# Patient Record
Sex: FEMALE | Race: WHITE | NOT HISPANIC OR LATINO | Employment: OTHER | URBAN - METROPOLITAN AREA
[De-identification: names, ages, dates, MRNs, and addresses within clinical notes are randomized per-mention and may not be internally consistent; named-entity substitution may affect disease eponyms.]

---

## 2018-01-15 ENCOUNTER — HISTORICAL (OUTPATIENT)
Dept: ADMINISTRATIVE | Facility: HOSPITAL | Age: 68
End: 2018-01-15

## 2018-01-15 LAB
ALBUMIN SERPL-MCNC: 3.4 GM/DL (ref 3.4–5)
ALBUMIN/GLOB SERPL: 1.2 RATIO (ref 1.1–2)
ALP SERPL-CCNC: 74 UNIT/L (ref 38–126)
ALT SERPL-CCNC: 16 UNIT/L (ref 12–78)
AST SERPL-CCNC: 10 UNIT/L (ref 15–37)
BILIRUB SERPL-MCNC: 0.4 MG/DL (ref 0.2–1)
BILIRUBIN DIRECT+TOT PNL SERPL-MCNC: 0.1 MG/DL (ref 0–0.5)
BILIRUBIN DIRECT+TOT PNL SERPL-MCNC: 0.3 MG/DL (ref 0–0.8)
BNP BLD-MCNC: 114 PG/ML (ref 0–125)
BUN SERPL-MCNC: 19 MG/DL (ref 7–18)
CALCIUM SERPL-MCNC: 9 MG/DL (ref 8.5–10.1)
CHLORIDE SERPL-SCNC: 103 MMOL/L (ref 98–107)
CO2 SERPL-SCNC: 26 MMOL/L (ref 21–32)
CREAT SERPL-MCNC: 0.65 MG/DL (ref 0.55–1.02)
ERYTHROCYTE [DISTWIDTH] IN BLOOD BY AUTOMATED COUNT: 13.9 % (ref 11.5–17)
GLOBULIN SER-MCNC: 2.8 GM/DL (ref 2.4–3.5)
GLUCOSE SERPL-MCNC: 156 MG/DL (ref 74–106)
HCT VFR BLD AUTO: 37.9 % (ref 37–47)
HGB BLD-MCNC: 12.1 GM/DL (ref 12–16)
MAGNESIUM SERPL-MCNC: 1.8 MG/DL (ref 1.8–2.4)
MCH RBC QN AUTO: 28.5 PG (ref 27–31)
MCHC RBC AUTO-ENTMCNC: 31.9 GM/DL (ref 33–36)
MCV RBC AUTO: 89.2 FL (ref 80–94)
PLATELET # BLD AUTO: 270 X10(3)/MCL (ref 130–400)
PMV BLD AUTO: 10.2 FL (ref 9.4–12.4)
POTASSIUM SERPL-SCNC: 3.5 MMOL/L (ref 3.5–5.1)
PROT SERPL-MCNC: 6.2 GM/DL (ref 6.4–8.2)
RBC # BLD AUTO: 4.25 X10(6)/MCL (ref 4.2–5.4)
SODIUM SERPL-SCNC: 140 MMOL/L (ref 136–145)
WBC # SPEC AUTO: 8.5 X10(3)/MCL (ref 4.5–11.5)

## 2020-09-29 ENCOUNTER — HOSPITAL ENCOUNTER (INPATIENT)
Facility: HOSPITAL | Age: 70
LOS: 1 days | Discharge: HOME | DRG: 085 | End: 2020-10-01
Attending: FAMILY MEDICINE | Admitting: EMERGENCY MEDICINE
Payer: MEDICARE

## 2020-09-29 DIAGNOSIS — I48.91 A-FIB: ICD-10-CM

## 2020-09-29 DIAGNOSIS — I60.9 SUBARACHNOID HEMORRHAGE: Primary | ICD-10-CM

## 2020-09-29 DIAGNOSIS — I63.311 STROKE DUE TO THROMBOSIS OF RIGHT MIDDLE CEREBRAL ARTERY: ICD-10-CM

## 2020-09-29 DIAGNOSIS — I48.0 PAROXYSMAL ATRIAL FIBRILLATION: ICD-10-CM

## 2020-09-29 PROCEDURE — 99285 EMERGENCY DEPT VISIT HI MDM: CPT | Mod: 25

## 2020-09-29 RX ORDER — ASPIRIN 81 MG/1
81 TABLET ORAL DAILY
COMMUNITY

## 2020-09-29 RX ORDER — METFORMIN HYDROCHLORIDE 1000 MG/1
1000 TABLET ORAL 2 TIMES DAILY WITH MEALS
COMMUNITY

## 2020-09-29 RX ORDER — SOTALOL HYDROCHLORIDE 120 MG/1
80 TABLET ORAL EVERY 12 HOURS
COMMUNITY

## 2020-09-29 RX ORDER — SIMVASTATIN 40 MG/1
40 TABLET, FILM COATED ORAL NIGHTLY
COMMUNITY

## 2020-09-29 RX ORDER — CITALOPRAM 40 MG/1
40 TABLET, FILM COATED ORAL DAILY
COMMUNITY

## 2020-09-29 RX ORDER — LANOLIN ALCOHOL/MO/W.PET/CERES
400 CREAM (GRAM) TOPICAL DAILY
COMMUNITY

## 2020-09-29 RX ORDER — LEVOTHYROXINE SODIUM 13 UG/1
CAPSULE ORAL
COMMUNITY

## 2020-09-29 RX ORDER — POTASSIUM CHLORIDE 600 MG/1
8 TABLET, FILM COATED, EXTENDED RELEASE ORAL ONCE
COMMUNITY

## 2020-09-29 RX ORDER — MIDODRINE HYDROCHLORIDE 5 MG/1
2.5 TABLET ORAL 2 TIMES DAILY WITH MEALS
COMMUNITY

## 2020-09-29 RX ORDER — OXYBUTYNIN CHLORIDE 5 MG/1
5 TABLET, EXTENDED RELEASE ORAL DAILY
COMMUNITY

## 2020-09-29 RX ORDER — ISOSORBIDE MONONITRATE 10 MG/1
10 TABLET ORAL 2 TIMES DAILY
COMMUNITY

## 2020-09-29 RX ORDER — CLOPIDOGREL BISULFATE 75 MG/1
75 TABLET ORAL DAILY
COMMUNITY

## 2020-09-29 RX ORDER — GABAPENTIN 300 MG/1
300 CAPSULE ORAL 3 TIMES DAILY
COMMUNITY

## 2020-09-29 RX ORDER — CELECOXIB 200 MG/1
200 CAPSULE ORAL
COMMUNITY

## 2020-09-30 PROBLEM — J44.9 CHRONIC OBSTRUCTIVE PULMONARY DISEASE: Status: ACTIVE | Noted: 2020-09-30

## 2020-09-30 PROBLEM — I60.9 SUBARACHNOID HEMORRHAGE: Status: ACTIVE | Noted: 2020-09-30

## 2020-09-30 PROBLEM — I49.5 TACHYCARDIA-BRADYCARDIA: Status: ACTIVE | Noted: 2020-09-30

## 2020-09-30 PROBLEM — E03.9 HYPOTHYROIDISM: Status: ACTIVE | Noted: 2020-09-30

## 2020-09-30 PROBLEM — E83.42 HYPOMAGNESEMIA: Status: ACTIVE | Noted: 2020-09-30

## 2020-09-30 PROBLEM — I25.10 ATHEROSCLEROSIS OF CORONARY ARTERY: Status: ACTIVE | Noted: 2020-09-30

## 2020-09-30 PROBLEM — I48.91 ATRIAL FIBRILLATION: Status: ACTIVE | Noted: 2020-09-30

## 2020-09-30 PROBLEM — E78.5 HYPERLIPIDEMIA: Status: ACTIVE | Noted: 2020-09-30

## 2020-09-30 PROBLEM — I10 HYPERTENSION: Status: ACTIVE | Noted: 2020-09-30

## 2020-09-30 PROBLEM — I77.9 PERIPHERAL ARTERIAL OCCLUSIVE DISEASE: Status: ACTIVE | Noted: 2020-09-30

## 2020-09-30 PROBLEM — E11.9 DIABETES MELLITUS: Status: ACTIVE | Noted: 2020-09-30

## 2020-09-30 PROBLEM — I95.1 ORTHOSTATIC HYPOTENSION: Status: ACTIVE | Noted: 2020-09-30

## 2020-09-30 LAB
ALBUMIN SERPL BCP-MCNC: 3.8 G/DL (ref 3.5–5.2)
ALP SERPL-CCNC: 69 U/L (ref 55–135)
ALT SERPL W/O P-5'-P-CCNC: 20 U/L (ref 10–44)
ANION GAP SERPL CALC-SCNC: 16 MMOL/L (ref 8–16)
AORTIC ROOT ANNULUS: 3.31 CM
ASCENDING AORTA: 3.2 CM
AST SERPL-CCNC: 22 U/L (ref 10–40)
AV INDEX (PROSTH): 0.92
AV MEAN GRADIENT: 2 MMHG
AV PEAK GRADIENT: 3 MMHG
AV VALVE AREA: 2.72 CM2
AV VELOCITY RATIO: 0.82
BASOPHILS # BLD AUTO: 0.02 K/UL (ref 0–0.2)
BASOPHILS NFR BLD: 0.2 % (ref 0–1.9)
BILIRUB SERPL-MCNC: 0.6 MG/DL (ref 0.1–1)
BSA FOR ECHO PROCEDURE: 2.45 M2
BUN SERPL-MCNC: 15 MG/DL (ref 8–23)
CALCIUM SERPL-MCNC: 9.1 MG/DL (ref 8.7–10.5)
CHLORIDE SERPL-SCNC: 98 MMOL/L (ref 95–110)
CHOLEST SERPL-MCNC: 224 MG/DL (ref 120–199)
CHOLEST/HDLC SERPL: 4.7 {RATIO} (ref 2–5)
CO2 SERPL-SCNC: 28 MMOL/L (ref 23–29)
CREAT SERPL-MCNC: 0.8 MG/DL (ref 0.5–1.4)
CV ECHO LV RWT: 0.7 CM
DIFFERENTIAL METHOD: ABNORMAL
DOP CALC AO PEAK VEL: 0.92 M/S
DOP CALC AO VTI: 16.94 CM
DOP CALC LVOT AREA: 3 CM2
DOP CALC LVOT DIAMETER: 1.94 CM
DOP CALC LVOT PEAK VEL: 0.75 M/S
DOP CALC LVOT STROKE VOLUME: 46.12 CM3
DOP CALC RVOT PEAK VEL: 0.69 M/S
DOP CALC RVOT VTI: 14.32 CM
DOP CALCLVOT PEAK VEL VTI: 15.61 CM
E WAVE DECELERATION TIME: 255.65 MSEC
E/A RATIO: 0.83
ECHO LV POSTERIOR WALL: 1.7 CM (ref 0.6–1.1)
EOSINOPHIL # BLD AUTO: 0.1 K/UL (ref 0–0.5)
EOSINOPHIL NFR BLD: 1.4 % (ref 0–8)
ERYTHROCYTE [DISTWIDTH] IN BLOOD BY AUTOMATED COUNT: 14.2 % (ref 11.5–14.5)
EST. GFR  (AFRICAN AMERICAN): >60 ML/MIN/1.73 M^2
EST. GFR  (NON AFRICAN AMERICAN): >60 ML/MIN/1.73 M^2
FRACTIONAL SHORTENING: 19 % (ref 28–44)
GLUCOSE SERPL-MCNC: 124 MG/DL (ref 70–110)
HCT VFR BLD AUTO: 42.3 % (ref 37–48.5)
HDLC SERPL-MCNC: 48 MG/DL (ref 40–75)
HDLC SERPL: 21.4 % (ref 20–50)
HGB BLD-MCNC: 14.1 G/DL (ref 12–16)
IMM GRANULOCYTES # BLD AUTO: 0.06 K/UL (ref 0–0.04)
IMM GRANULOCYTES NFR BLD AUTO: 0.7 % (ref 0–0.5)
INTERVENTRICULAR SEPTUM: 1.68 CM (ref 0.6–1.1)
IVRT: 96.89 MSEC
LA MAJOR: 4.65 CM
LA MINOR: 4.88 CM
LA WIDTH: 3.67 CM
LDLC SERPL CALC-MCNC: 135.2 MG/DL (ref 63–159)
LEFT ATRIUM SIZE: 3.86 CM
LEFT ATRIUM VOLUME INDEX: 24.6 ML/M2
LEFT ATRIUM VOLUME: 57.34 CM3
LEFT INTERNAL DIMENSION IN SYSTOLE: 3.93 CM (ref 2.1–4)
LEFT VENTRICLE DIASTOLIC VOLUME INDEX: 48.06 ML/M2
LEFT VENTRICLE DIASTOLIC VOLUME: 111.94 ML
LEFT VENTRICLE MASS INDEX: 160 G/M2
LEFT VENTRICLE SYSTOLIC VOLUME INDEX: 28.8 ML/M2
LEFT VENTRICLE SYSTOLIC VOLUME: 66.97 ML
LEFT VENTRICULAR INTERNAL DIMENSION IN DIASTOLE: 4.88 CM (ref 3.5–6)
LEFT VENTRICULAR MASS: 372.79 G
LYMPHOCYTES # BLD AUTO: 1.7 K/UL (ref 1–4.8)
LYMPHOCYTES NFR BLD: 19.7 % (ref 18–48)
MAGNESIUM SERPL-MCNC: 1.9 MG/DL (ref 1.6–2.6)
MCH RBC QN AUTO: 30.8 PG (ref 27–31)
MCHC RBC AUTO-ENTMCNC: 33.3 G/DL (ref 32–36)
MCV RBC AUTO: 92 FL (ref 82–98)
MONOCYTES # BLD AUTO: 0.5 K/UL (ref 0.3–1)
MONOCYTES NFR BLD: 5.5 % (ref 4–15)
MV PEAK A VEL: 0.58 M/S
MV PEAK E VEL: 0.48 M/S
MV STENOSIS PRESSURE HALF TIME: 74.14 MS
MV VALVE AREA P 1/2 METHOD: 2.97 CM2
NEUTROPHILS # BLD AUTO: 6.3 K/UL (ref 1.8–7.7)
NEUTROPHILS NFR BLD: 72.5 % (ref 38–73)
NONHDLC SERPL-MCNC: 176 MG/DL
NRBC BLD-RTO: 0 /100 WBC
PISA MRMAX VEL: 0.04 M/S
PLATELET # BLD AUTO: 245 K/UL (ref 150–350)
PMV BLD AUTO: 11 FL (ref 9.2–12.9)
POTASSIUM SERPL-SCNC: 2.9 MMOL/L (ref 3.5–5.1)
PROT SERPL-MCNC: 6.5 G/DL (ref 6–8.4)
PV MEAN GRADIENT: 1.26 MMHG
RA MAJOR: 3.65 CM
RA PRESSURE: 3 MMHG
RA WIDTH: 3.48 CM
RBC # BLD AUTO: 4.58 M/UL (ref 4–5.4)
SARS-COV-2 RDRP RESP QL NAA+PROBE: NEGATIVE
SINUS: 3.54 CM
SODIUM SERPL-SCNC: 142 MMOL/L (ref 136–145)
STJ: 3.45 CM
T4 FREE SERPL-MCNC: <0.4 NG/DL (ref 0.71–1.51)
TRICUSPID ANNULAR PLANE SYSTOLIC EXCURSION: 1.53 CM
TRIGL SERPL-MCNC: 204 MG/DL (ref 30–150)
TSH SERPL DL<=0.005 MIU/L-ACNC: 35.85 UIU/ML (ref 0.4–4)
WBC # BLD AUTO: 8.71 K/UL (ref 3.9–12.7)

## 2020-09-30 PROCEDURE — U0002 COVID-19 LAB TEST NON-CDC: HCPCS

## 2020-09-30 PROCEDURE — 93005 ELECTROCARDIOGRAM TRACING: CPT

## 2020-09-30 PROCEDURE — 83735 ASSAY OF MAGNESIUM: CPT

## 2020-09-30 PROCEDURE — 97110 THERAPEUTIC EXERCISES: CPT

## 2020-09-30 PROCEDURE — 99223 PR INITIAL HOSPITAL CARE,LEVL III: ICD-10-PCS | Mod: 25,,, | Performed by: INTERNAL MEDICINE

## 2020-09-30 PROCEDURE — 63600175 PHARM REV CODE 636 W HCPCS: Performed by: EMERGENCY MEDICINE

## 2020-09-30 PROCEDURE — 84439 ASSAY OF FREE THYROXINE: CPT

## 2020-09-30 PROCEDURE — 97530 THERAPEUTIC ACTIVITIES: CPT

## 2020-09-30 PROCEDURE — 99223 1ST HOSP IP/OBS HIGH 75: CPT | Mod: 25,,, | Performed by: INTERNAL MEDICINE

## 2020-09-30 PROCEDURE — 80061 LIPID PANEL: CPT

## 2020-09-30 PROCEDURE — 93010 ELECTROCARDIOGRAM REPORT: CPT | Mod: ,,, | Performed by: INTERNAL MEDICINE

## 2020-09-30 PROCEDURE — 99223 PR INITIAL HOSPITAL CARE,LEVL III: ICD-10-PCS | Mod: ,,, | Performed by: PSYCHIATRY & NEUROLOGY

## 2020-09-30 PROCEDURE — 25000003 PHARM REV CODE 250: Performed by: INTERNAL MEDICINE

## 2020-09-30 PROCEDURE — 97165 OT EVAL LOW COMPLEX 30 MIN: CPT

## 2020-09-30 PROCEDURE — 84443 ASSAY THYROID STIM HORMONE: CPT

## 2020-09-30 PROCEDURE — 80053 COMPREHEN METABOLIC PANEL: CPT

## 2020-09-30 PROCEDURE — 25000003 PHARM REV CODE 250: Performed by: PSYCHIATRY & NEUROLOGY

## 2020-09-30 PROCEDURE — 97161 PT EVAL LOW COMPLEX 20 MIN: CPT

## 2020-09-30 PROCEDURE — 11000001 HC ACUTE MED/SURG PRIVATE ROOM

## 2020-09-30 PROCEDURE — 36415 COLL VENOUS BLD VENIPUNCTURE: CPT

## 2020-09-30 PROCEDURE — 93010 EKG 12-LEAD: ICD-10-PCS | Mod: ,,, | Performed by: INTERNAL MEDICINE

## 2020-09-30 PROCEDURE — 99223 1ST HOSP IP/OBS HIGH 75: CPT | Mod: ,,, | Performed by: PSYCHIATRY & NEUROLOGY

## 2020-09-30 PROCEDURE — 85025 COMPLETE CBC W/AUTO DIFF WBC: CPT

## 2020-09-30 RX ORDER — SODIUM CHLORIDE 0.9 % (FLUSH) 0.9 %
10 SYRINGE (ML) INJECTION
Status: DISCONTINUED | OUTPATIENT
Start: 2020-09-30 | End: 2020-10-01 | Stop reason: HOSPADM

## 2020-09-30 RX ORDER — GLUCAGON 1 MG
1 KIT INJECTION
Status: DISCONTINUED | OUTPATIENT
Start: 2020-09-30 | End: 2020-10-01 | Stop reason: HOSPADM

## 2020-09-30 RX ORDER — SOTALOL HYDROCHLORIDE 80 MG/1
80 TABLET ORAL EVERY 12 HOURS
Status: DISCONTINUED | OUTPATIENT
Start: 2020-09-30 | End: 2020-10-01 | Stop reason: HOSPADM

## 2020-09-30 RX ORDER — SIMVASTATIN 20 MG/1
60 TABLET, FILM COATED ORAL NIGHTLY
Status: DISCONTINUED | OUTPATIENT
Start: 2020-09-30 | End: 2020-10-01 | Stop reason: HOSPADM

## 2020-09-30 RX ORDER — LEVOTHYROXINE SODIUM 100 UG/1
100 TABLET ORAL
Status: DISCONTINUED | OUTPATIENT
Start: 2020-09-30 | End: 2020-10-01 | Stop reason: HOSPADM

## 2020-09-30 RX ORDER — IBUPROFEN 200 MG
16 TABLET ORAL
Status: DISCONTINUED | OUTPATIENT
Start: 2020-09-30 | End: 2020-10-01 | Stop reason: HOSPADM

## 2020-09-30 RX ORDER — LABETALOL HYDROCHLORIDE 5 MG/ML
10 INJECTION, SOLUTION INTRAVENOUS EVERY 4 HOURS PRN
Status: DISCONTINUED | OUTPATIENT
Start: 2020-09-30 | End: 2020-10-01 | Stop reason: HOSPADM

## 2020-09-30 RX ORDER — CLOPIDOGREL BISULFATE 75 MG/1
75 TABLET ORAL DAILY
Status: DISCONTINUED | OUTPATIENT
Start: 2020-10-01 | End: 2020-10-01 | Stop reason: HOSPADM

## 2020-09-30 RX ORDER — IBUPROFEN 200 MG
24 TABLET ORAL
Status: DISCONTINUED | OUTPATIENT
Start: 2020-09-30 | End: 2020-10-01 | Stop reason: HOSPADM

## 2020-09-30 RX ORDER — ASPIRIN 81 MG/1
81 TABLET ORAL DAILY
Status: DISCONTINUED | OUTPATIENT
Start: 2020-10-01 | End: 2020-10-01 | Stop reason: HOSPADM

## 2020-09-30 RX ORDER — SIMVASTATIN 20 MG/1
40 TABLET, FILM COATED ORAL NIGHTLY
Status: DISCONTINUED | OUTPATIENT
Start: 2020-09-30 | End: 2020-09-30

## 2020-09-30 RX ADMIN — LEVOTHYROXINE SODIUM 100 MCG: 0.1 TABLET ORAL at 05:09

## 2020-09-30 RX ADMIN — SOTALOL HYDROCHLORIDE 80 MG: 80 TABLET ORAL at 09:09

## 2020-09-30 RX ADMIN — HUMAN ALBUMIN MICROSPHERES AND PERFLUTREN 0.11 MG: 10; .22 INJECTION, SOLUTION INTRAVENOUS at 12:09

## 2020-09-30 RX ADMIN — SOTALOL HYDROCHLORIDE 80 MG: 80 TABLET ORAL at 10:09

## 2020-09-30 RX ADMIN — SIMVASTATIN 40 MG: 20 TABLET, FILM COATED ORAL at 01:09

## 2020-09-30 RX ADMIN — SIMVASTATIN 60 MG: 20 TABLET, FILM COATED ORAL at 09:09

## 2020-09-30 NOTE — PT/OT/SLP EVAL
Occupational Therapy   Evaluation and Discharge Note    Name: Monae Van  MRN: [id number]  Admitting Diagnosis:  Subarachnoid hemorrhage      Recommendations:     Discharge Recommendations: home  Discharge Equipment Recommendations:  none  Barriers to discharge:  None    Assessment:     Monae Van is a 70 y.o. female with a medical diagnosis of Subarachnoid hemorrhage. At this time, patient is functioning at their prior level of function and does not require further acute OT services.     Plan:     During this hospitalization, patient does not require further acute OT services.  Please re-consult if situation changes.    · Plan of Care Reviewed with: patient   · Pt placed on people 's program    Subjective     Chief Complaint:   Patient/Family Comments/goals:     Occupational Profile:  Living Environment: lives in 1 story house with 5 steps with 1 side rail  Previous level of function: (I) with adl's and functional mobility  Roles and Routines: occupational therapy  Equipment Used at home:  walker, rolling, cane, straight  Assistance upon Discharge:     Pain/Comfort:  · Pain Rating 1: 0/10    Patients cultural, spiritual, Christianity conflicts given the current situation:      Objective:     Communicated with: nurse and epic chart review prior to session.  Patient found HOB elevated with telemetry upon OT entry to room.    General Precautions: Standard,     Orthopedic Precautions:N/A   Braces: N/A     Occupational Performance:    Bed Mobility:    · Patient completed Rolling/Turning to Right with independence  · Patient completed Scooting/Bridging with independence  · Patient completed Supine to Sit with independence    Functional Mobility/Transfers:  · Patient completed Sit <> Stand Transfer with independence  with  no assistive device   · Patient completed Bed <> Chair Transfer using Step Transfer technique with independence with no assistive device  · Functional Mobility: (I) with ambulation x 20  feet    Activities of Daily Living:  · Upper Body Dressing: supervision .  · Lower Body Dressing: supervision .    Cognitive/Visual Perceptual:  Cognitive/Psychosocial Skills:     -       Oriented to: Person, Place, Time and Situation   -       Follows Commands/attention:Follows multistep  commands  -       Communication: clear/fluent  -       Memory: No Deficits noted  -       Safety awareness/insight to disability: intact     Physical Exam:  Upper Extremity Range of Motion:     -       Right Upper Extremity: WFL  -       Left Upper Extremity: WFL  Upper Extremity Strength:    -       Right Upper Extremity: mmt: 4/5 grossly  -       Left Upper Extremity: mmt: 4/5 grossly   Strength:    -       Right Upper Extremity: WFL  -       Left Upper Extremity: WFL       AMPAC 6 Click ADL:  AMPAC Total Score: 24    Treatment & Education:  Pt with min deficits with ue strength /endurance. See above eval for details. Pt discharged from skilled ot and placed on people 's program  Education:    Patient left up in chair with all lines intact and call button in reach eating dinner    GOALS:   Multidisciplinary Problems     Occupational Therapy Goals     Not on file                History:     Past Medical History:   Diagnosis Date    A-fib     CAD (coronary artery disease)     COPD (chronic obstructive pulmonary disease)     Diabetes     HTN (hypertension)     Pacemaker     [employer]: MRI Compatible    PAD (peripheral artery disease)     S/P CABG (coronary artery bypass graft) 2017    S/P carotid endarterectomy 2015    left       Past Surgical History:   Procedure Laterality Date    APPENDECTOMY      cardiac stents      CHOLECYSTECTOMY      CORONARY ARTERY BYPASS GRAFT      HYSTERECTOMY      THYROID SURGERY         Time Tracking:     OT Date of Treatment: 09/30/20  OT Start Time: 1640  OT Stop Time: 1710  OT Total Time (min): 30 min    Billable Minutes:Evaluation 15 minutes  Therapeutic Activity 15  minutes    Brenda Fontaine, OT  9/30/2020

## 2020-09-30 NOTE — H&P
[facility] Hospital Medicine  History & Physical    Patient Name: Monae Van  MRN: [id number]  Admission Date: 9/29/2020  Attending Physician: Mckenzie Elise MD   Primary Care Provider: Yoshi Cordero MD         Patient information was obtained from patient, past medical records and ER records.     Subjective:     Principal Problem:Subarachnoid hemorrhage    Chief Complaint:   Chief Complaint   Patient presents with    Extremity Weakness     Transfer , Subarachnoid bleed, s/p fall 3 days        HPI:  History was taken from patient and from review of PMH from [facility]. She says she cannot remember exact dates.  70 y.o. female patient with a PMHx of A-fib, CAD, diabetes, and HTN ,low back pain,pacemaker in situ  who presents to the Emergency Department for evaluation of left-sided extremity weakness which onset gradually 4 days ago. She was seen at [facility] on 09.29 with chief complaint of left sided weakness  For 3 days She went to visit her sister 4 days ago and she feel and hit and her head and woke up with severe headache.CT head showed a small subarachnoid hemorrhage without falx effect .  She was transferred here for neurosurgery evaluation.  There was associated history of slurred speech and left sided weakness that was noticed by the  granddaughter too.Patient denies any n/v, visual disturbance, LOC, dizziness, back pain, neck pain, knee pain, hip pain, abdominal pain. She is on Plavix and Asprin at home  .    Past Medical History:   Diagnosis Date    A-fib     CAD (coronary artery disease)     COPD (chronic obstructive pulmonary disease)     Diabetes     HTN (hypertension)     Pacemaker     PAD (peripheral artery disease)        Past Surgical History:   Procedure Laterality Date    APPENDECTOMY      cardiac stents      CHOLECYSTECTOMY      CORONARY ARTERY BYPASS GRAFT      HYSTERECTOMY      THYROID SURGERY         Review of patient's allergies indicates:    Allergen Reactions    Avelox [moxifloxacin]     Hydrocodone     Morpholine analogues     Panlor (hydrocodone-acetamin)        No current facility-administered medications on file prior to encounter.      Current Outpatient Medications on File Prior to Encounter   Medication Sig    aspirin (ECOTRIN) 81 MG EC tablet Take 81 mg by mouth once daily.    celecoxib (CELEBREX) 200 MG capsule Take 200 mg by mouth.    citalopram (CELEXA) 40 MG tablet Take 40 mg by mouth once daily.    clopidogreL (PLAVIX) 75 mg tablet Take 75 mg by mouth once daily.    gabapentin (NEURONTIN) 300 MG capsule Take 300 mg by mouth 3 (three) times daily.    levothyroxine 150 mcg Cap Take by mouth.    metFORMIN (GLUCOPHAGE) 1000 MG tablet Take 1,000 mg by mouth 2 (two) times daily with meals.    simvastatin (ZOCOR) 40 MG tablet Take 40 mg by mouth every evening.    isosorbide mononitrate (ISMO,MONOKET) 10 mg tablet Take 10 mg by mouth 2 (two) times daily.    magnesium oxide (MAG-OX) 400 mg (241.3 mg magnesium) tablet Take 400 mg by mouth once daily.    midodrine (PROAMATINE) 5 MG Tab Take 2.5 mg by mouth 2 (two) times daily with meals.    oxybutynin (DITROPAN-XL) 5 MG TR24 Take 5 mg by mouth once daily.    potassium chloride (KLOR-CON) 8 MEQ TbSR Take 8 mEq by mouth once.    sotaloL (BETAPACE) 120 MG Tab Take 80 mg by mouth every 12 (twelve) hours.     Family History     None        Tobacco Use    Smoking status: Former Smoker    Smokeless tobacco: Never Used   Substance and Sexual Activity    Alcohol use: Never     Frequency: Never    Drug use: Never    Sexual activity: Not on file     Review of Systems   Constitutional: Positive for activity change. Negative for chills and fatigue.   HENT: Negative for congestion, ear pain, facial swelling, sinus pressure and sore throat.    Eyes: Negative for pain.   Respiratory: Negative for apnea, chest tightness, shortness of breath and stridor.    Cardiovascular: Negative for chest  pain, palpitations and leg swelling.   Gastrointestinal: Negative for abdominal distention, abdominal pain, diarrhea and nausea.   Endocrine: Negative for polydipsia and polyphagia.   Genitourinary: Negative for decreased urine volume, difficulty urinating, frequency and genital sores.   Musculoskeletal: Negative for arthralgias and gait problem.   Neurological: Negative for light-headedness and headaches.   Hematological: Negative for adenopathy.   Psychiatric/Behavioral: Negative for agitation, confusion and decreased concentration.     Objective:     Vital Signs (Most Recent):  Temp: 97.5 °F (36.4 °C) (09/30/20 0718)  Pulse: 71 (09/30/20 0718)  Resp: 18 (09/30/20 0718)  BP: 120/69 (09/30/20 0718)  SpO2: (!) 94 % (09/30/20 0718) Vital Signs (24h Range):  Temp:  [97.5 °F (36.4 °C)-98.3 °F (36.8 °C)] 97.5 °F (36.4 °C)  Pulse:  [70-89] 71  Resp:  [14-19] 18  SpO2:  [94 %-99 %] 94 %  BP: (120-161)/(62-90) 120/69     Weight: 126.8 kg (279 lb 9.6 oz)  Body mass index is 43.79 kg/m².    Physical Exam  Vitals signs and nursing note reviewed.   Constitutional:       Appearance: She is well-developed.   HENT:      Head: Normocephalic and atraumatic.   Eyes:      Pupils: Pupils are equal, round, and reactive to light.   Neck:      Musculoskeletal: Normal range of motion and neck supple.      Thyroid: No thyromegaly.      Trachea: No tracheal deviation.   Cardiovascular:      Rate and Rhythm: Normal rate and regular rhythm.   Pulmonary:      Effort: No respiratory distress.      Breath sounds: No wheezing or rales.   Abdominal:      General: Bowel sounds are normal. There is no distension.      Palpations: Abdomen is soft.      Tenderness: There is no abdominal tenderness.   Skin:     General: Skin is warm and dry.      Coloration: Skin is not pale.   Neurological:      Mental Status: She is alert. Mental status is at baseline.      Cranial Nerves: No cranial nerve deficit.      Coordination: Coordination normal.      Deep  Tendon Reflexes: Reflexes are normal and symmetric.   Psychiatric:         Thought Content: Thought content normal.         Judgment: Judgment normal.          Significant Labs: BMP: No results for input(s): GLU, NA, K, CL, CO2, BUN, CREATININE, CALCIUM, MG in the last 48 hours.  CBC: No results for input(s): WBC, HGB, HCT, PLT in the last 48 hours.  All pertinent labs within the past 24 hours have been reviewed.    Significant Imaging: I have reviewed and interpreted all pertinent imaging results/findings within the past 24 hours.    Assessment/Plan:     * Subarachnoid hemorrhage    Will follow neurosurgery .  Needs serial neuro checks.  Close BP monitoring -For most patients with acute SAH, the goal is to maintain systolic blood pressure (SBP) <160 mmHg or mean arterial pressure (MAP) <110 mmHg, as recommended by guidelines.  Will avoid hypotension .      Orthostatic hypotension    She is on midodrine at home -will resume.    Hypomagnesemia    Will check serum mag this morning and will adjust therapy     Hyperlipidemia    Continue Zocor    Hypothyroidism    Will resume synthroid -check TSH    Diabetes mellitus    Will continue insulin sliding scale, diabetic diet       Atrial fibrillation    Will continue rate control with sotalol, hold aspirin/plavix       VTE Risk Mitigation (From admission, onward)         Ordered     IP VTE HIGH RISK PATIENT  Once      09/30/20 0038     Place sequential compression device  Until discontinued      09/30/20 0038                   Chele Cooper MD  Department of Hospital Medicine   [facility]

## 2020-09-30 NOTE — ASSESSMENT & PLAN NOTE
Will follow neurosurgery .  Needs serial neuro checks.  Close BP monitoring -For most patients with acute SAH, the goal is to maintain systolic blood pressure (SBP) <160 mmHg or mean arterial pressure (MAP) <110 mmHg, as recommended by guidelines.  Will avoid hypotension .

## 2020-09-30 NOTE — SUBJECTIVE & OBJECTIVE
Past Medical History:   Diagnosis Date    A-fib     CAD (coronary artery disease)     COPD (chronic obstructive pulmonary disease)     Diabetes     HTN (hypertension)     Pacemaker     [employer]: MRI Compatible    PAD (peripheral artery disease)     S/P CABG (coronary artery bypass graft) 2017    S/P carotid endarterectomy 2015    left       Past Surgical History:   Procedure Laterality Date    APPENDECTOMY      cardiac stents      CHOLECYSTECTOMY      CORONARY ARTERY BYPASS GRAFT      HYSTERECTOMY      THYROID SURGERY         Review of patient's allergies indicates:   Allergen Reactions    Avelox [moxifloxacin]     Hydrocodone     Morpholine analogues     Panlor (hydrocodone-acetamin)        No current facility-administered medications on file prior to encounter.      Current Outpatient Medications on File Prior to Encounter   Medication Sig    aspirin (ECOTRIN) 81 MG EC tablet Take 81 mg by mouth once daily.    celecoxib (CELEBREX) 200 MG capsule Take 200 mg by mouth.    citalopram (CELEXA) 40 MG tablet Take 40 mg by mouth once daily.    clopidogreL (PLAVIX) 75 mg tablet Take 75 mg by mouth once daily.    gabapentin (NEURONTIN) 300 MG capsule Take 300 mg by mouth 3 (three) times daily.    levothyroxine 150 mcg Cap Take by mouth.    metFORMIN (GLUCOPHAGE) 1000 MG tablet Take 1,000 mg by mouth 2 (two) times daily with meals.    simvastatin (ZOCOR) 40 MG tablet Take 40 mg by mouth every evening.    isosorbide mononitrate (ISMO,MONOKET) 10 mg tablet Take 10 mg by mouth 2 (two) times daily.    magnesium oxide (MAG-OX) 400 mg (241.3 mg magnesium) tablet Take 400 mg by mouth once daily.    midodrine (PROAMATINE) 5 MG Tab Take 2.5 mg by mouth 2 (two) times daily with meals.    oxybutynin (DITROPAN-XL) 5 MG TR24 Take 5 mg by mouth once daily.    potassium chloride (KLOR-CON) 8 MEQ TbSR Take 8 mEq by mouth once.    sotaloL (BETAPACE) 120 MG Tab Take 80 mg by mouth every 12 (twelve) hours.      Family History     Problem Relation (Age of Onset)    Stroke Sister        Tobacco Use    Smoking status: Former Smoker     Quit date:      Years since quittin.7    Smokeless tobacco: Never Used   Substance and Sexual Activity    Alcohol use: Never     Frequency: Never    Drug use: Never    Sexual activity: Not on file     Review of Systems   Constitution: Positive for malaise/fatigue.   HENT: Negative for hearing loss and hoarse voice.    Eyes: Negative for blurred vision and visual disturbance.   Cardiovascular: Negative for chest pain, claudication, dyspnea on exertion, irregular heartbeat, leg swelling, near-syncope, orthopnea, palpitations, paroxysmal nocturnal dyspnea and syncope.   Respiratory: Negative for cough, hemoptysis, shortness of breath, sleep disturbances due to breathing, snoring and wheezing.    Endocrine: Negative for cold intolerance and heat intolerance.   Hematologic/Lymphatic: Bruises/bleeds easily.   Skin: Negative for color change, dry skin and nail changes.   Musculoskeletal: Positive for arthritis and back pain. Negative for joint pain and myalgias.   Gastrointestinal: Negative for bloating, abdominal pain, constipation, nausea and vomiting.   Genitourinary: Negative for dysuria, flank pain, hematuria and hesitancy.   Neurological: Positive for focal weakness and headaches. Negative for light-headedness, loss of balance, numbness, paresthesias and weakness.   Psychiatric/Behavioral: Negative for altered mental status.   Allergic/Immunologic: Negative for environmental allergies.     Objective:     Vital Signs (Most Recent):  Temp: 97.6 °F (36.4 °C) (20 1319)  Pulse: 71 (20 131)  Resp: 18 (20 131)  BP: 120/62 (20 131)  SpO2: (!) 94 % (20 131) Vital Signs (24h Range):  Temp:  [97.5 °F (36.4 °C)-98.3 °F (36.8 °C)] 97.6 °F (36.4 °C)  Pulse:  [69-89] 71  Resp:  [14-19] 18  SpO2:  [94 %-99 %] 94 %  BP: (120-161)/(62-90) 120/62     Weight:  126.6 kg (279 lb)  Body mass index is 43.7 kg/m².    SpO2: (!) 94 %  O2 Device (Oxygen Therapy): room air      Intake/Output Summary (Last 24 hours) at 9/30/2020 1505  Last data filed at 9/30/2020 1200  Gross per 24 hour   Intake 300 ml   Output 320 ml   Net -20 ml       Lines/Drains/Airways     Peripheral Intravenous Line                 Peripheral IV - Single Lumen 20 G Right Forearm -- days                Physical Exam   Constitutional: She is oriented to person, place, and time. She appears well-developed and well-nourished. No distress.   HENT:   Head: Normocephalic and atraumatic.   Eyes: Pupils are equal, round, and reactive to light.   Neck: Normal range of motion and full passive range of motion without pain. Neck supple. No JVD present.   Cardiovascular: Normal rate, S1 normal, S2 normal and intact distal pulses. An irregular rhythm present. PMI is not displaced. Exam reveals no distant heart sounds.   No murmur heard.  Pulses:       Radial pulses are 2+ on the right side and 2+ on the left side.        Dorsalis pedis pulses are 2+ on the right side and 2+ on the left side.   S/P PPM implant   Pulmonary/Chest: Effort normal and breath sounds normal. No accessory muscle usage. No respiratory distress. She has no decreased breath sounds. She has no wheezes. She has no rales.   Abdominal: Soft. Bowel sounds are normal. She exhibits no distension. There is no abdominal tenderness.   Musculoskeletal: Normal range of motion.         General: No edema.      Right ankle: She exhibits swelling.      Left ankle: She exhibits swelling.   Neurological: She is alert and oriented to person, place, and time.   Skin: Skin is warm and dry. She is not diaphoretic. No cyanosis. Nails show no clubbing.   Psychiatric: She has a normal mood and affect. Her speech is normal and behavior is normal. Judgment and thought content normal. Cognition and memory are normal.   Nursing note and vitals reviewed.      Significant Labs:    BMP:   Recent Labs   Lab 09/30/20  0744   *      K 2.9*   CL 98   CO2 28   BUN 15   CREATININE 0.8   CALCIUM 9.1   MG 1.9   , CBC   Recent Labs   Lab 09/30/20  0744   WBC 8.71   HGB 14.1   HCT 42.3        Results for orders placed during the hospital encounter of 09/29/20   Echo Color Flow Doppler? Yes; Bubble Contrast? Yes    Narrative · There is no evidence of intracardiac shunting.  · There is severe left ventricular concentric hypertrophy.  · The left ventricle is normal in sizeThe estimated ejection fraction is   50%.  · Normal left ventricular diastolic function.  · There is left ventricular global hypokinesis.  · Septal wall has abnormal motion consistent with post-operative status.  · Low normal right ventricular systolic function.  · Normal central venous pressure (3 mmHg).

## 2020-09-30 NOTE — PLAN OF CARE
Pt remains free from falls/injuries this shift. Safety precautions maintained. Neuro checks q2 hours, WNL. No s/s of acute distress noted. Will continue to monitor. Chart check completed.

## 2020-09-30 NOTE — HPI
Monae Van is a 70 year old female who presented to [facility] from [facility] due to Neurosurgery evaluation. She reported left sided weakness which onset about 4 days ago. Of note, she fell and hit her head and woke up with severe headache. She had CT head at [facility] which revealed a small SAH without falx effect. She was subsequently transferred to [facility] for Neurosurgery evaluation. She reported associated slurred speech and left sided weakness that was noticeable per family members. Her current medical conditions include AFIB, CAD s/p CABG, s/p Left CEA, HTN, HLP, PAD, PPM, COPD, DM. Cardiology consulted to assist with medical management. Chart reviewed, patient seen and examined. Pacemaker interrogation revealed an episode of 4 hours of AFIB. ECHO pending. Neurology recommended to resume DAPT and Statin therapy for small non-surgical SAH/left sided weakness concerning for small CVA. Of note, her TSH is 35.85 and she was started on synthroid this admission. She is followed by Dr Santos in [place].

## 2020-09-30 NOTE — HOSPITAL COURSE
"She reports [employer] pacemaker from 2017, MRI compatible. 6 days ago she felt weakness in her left leg which led to a fall, striking her head without loss of consciousness. Some on and off headache. She felt that both her left arm and leg were weak and some paresthesias. She just felt "off" and went to bed for 3-4 days, slept a lot, was able to ambulate to bathroom. Some initial slurred speech which has resolved.  No N/V,no visual loss or diplopia. Finally sought medical help and CT head with small falcine bleed, stable on repeat CT today without any evidence of stroke. No headache at this time, hungry. No dysphagia. No CP or palpitations. Neurology consulted. Pacemaker interrogation for AFib pending. ECG shows atrial paced rythym. TSH 35.8    10/1/20 - Needs outpatient referral for physical therapy - 3 x week. No changes in Meds: Continue ASA 81 mg and Plavix 75 mg. She will need Eliquis 5 mg BID in 2 weeks and stop Plavix at that time, continue aspirin. Followup with her Cardiologist. [employer] Pacemaker was interrogated during admission and she was found to be in Atrial Fib which started the same time she "fell." Neurology felt patient had a CVA first, fell, then had SAH. Her Pacemaker is MRI compatible, but These MRI's are only done in [place]. Patient seen and examined and deemed stable for d/c.     "

## 2020-09-30 NOTE — PLAN OF CARE
Chart reviewed, pt resting in bed with call light and personal belongings within reach. Vitals stable.  Neuro checks every 4 hours.  Bed alarm active, pt absent of injury throughout shift, will continue to monitor.

## 2020-09-30 NOTE — CONSULTS
[facility]  Cardiology  Consult Note    Patient Name: Monae Van  MRN: [id number]  Admission Date: 9/29/2020  Hospital Length of Stay: 0 days  Code Status: Full Code   Attending Provider: Mckenzie Elise MD   Consulting Provider: FREDERICK Juárez  Primary Care Physician: Yoshi Cordero MD  Principal Problem:Subarachnoid hemorrhage    Patient information was obtained from patient, caregiver / friend, past medical records and ER records.     Inpatient consult to Cardiology  Consult performed by: FREDERICK Garcia  Consult ordered by: Michelle Mendez NP        Subjective:     Chief Complaint:  weakness     HPI:   Monae Van is a 70 year old female who presented to [facility] from [facility] due to Neurosurgery evaluation. She reported left sided weakness which onset about 4 days ago. Of note, she fell and hit her head and woke up with severe headache. She had CT head at [facility] which revealed a small SAH without falx effect. She was subsequently transferred to [facility] for Neurosurgery evaluation. She reported associated slurred speech and left sided weakness that was noticeable per family members. Her current medical conditions include AFIB, CAD s/p CABG, s/p Left CEA, HTN, HLP, PAD, PPM, COPD, DM. Cardiology consulted to assist with medical management. Chart reviewed, patient seen and examined. Pacemaker interrogation revealed an episode of 4 hours of AFIB. ECHO pending. Neurology recommended to resume DAPT and Statin therapy for small non-surgical SAH/left sided weakness concerning for small CVA. Of note, her TSH is 35.85 and she was started on synthroid this admission. She is followed by Dr Santos in [place].     Past Medical History:   Diagnosis Date    A-fib     CAD (coronary artery disease)     COPD (chronic obstructive pulmonary disease)     Diabetes     HTN (hypertension)     Pacemaker     [employer]: MRI Compatible    PAD (peripheral artery disease)      S/P CABG (coronary artery bypass graft) 2017    S/P carotid endarterectomy 2015    left       Past Surgical History:   Procedure Laterality Date    APPENDECTOMY      cardiac stents      CHOLECYSTECTOMY      CORONARY ARTERY BYPASS GRAFT      HYSTERECTOMY      THYROID SURGERY         Review of patient's allergies indicates:   Allergen Reactions    Avelox [moxifloxacin]     Hydrocodone     Morpholine analogues     Panlor (hydrocodone-acetamin)        No current facility-administered medications on file prior to encounter.      Current Outpatient Medications on File Prior to Encounter   Medication Sig    aspirin (ECOTRIN) 81 MG EC tablet Take 81 mg by mouth once daily.    celecoxib (CELEBREX) 200 MG capsule Take 200 mg by mouth.    citalopram (CELEXA) 40 MG tablet Take 40 mg by mouth once daily.    clopidogreL (PLAVIX) 75 mg tablet Take 75 mg by mouth once daily.    gabapentin (NEURONTIN) 300 MG capsule Take 300 mg by mouth 3 (three) times daily.    levothyroxine 150 mcg Cap Take by mouth.    metFORMIN (GLUCOPHAGE) 1000 MG tablet Take 1,000 mg by mouth 2 (two) times daily with meals.    simvastatin (ZOCOR) 40 MG tablet Take 40 mg by mouth every evening.    isosorbide mononitrate (ISMO,MONOKET) 10 mg tablet Take 10 mg by mouth 2 (two) times daily.    magnesium oxide (MAG-OX) 400 mg (241.3 mg magnesium) tablet Take 400 mg by mouth once daily.    midodrine (PROAMATINE) 5 MG Tab Take 2.5 mg by mouth 2 (two) times daily with meals.    oxybutynin (DITROPAN-XL) 5 MG TR24 Take 5 mg by mouth once daily.    potassium chloride (KLOR-CON) 8 MEQ TbSR Take 8 mEq by mouth once.    sotaloL (BETAPACE) 120 MG Tab Take 80 mg by mouth every 12 (twelve) hours.     Family History     Problem Relation (Age of Onset)    Stroke Sister        Tobacco Use    Smoking status: Former Smoker     Quit date:      Years since quittin.7    Smokeless tobacco: Never Used   Substance and Sexual Activity    Alcohol use:  Never     Frequency: Never    Drug use: Never    Sexual activity: Not on file     Review of Systems   Constitution: Positive for malaise/fatigue.   HENT: Negative for hearing loss and hoarse voice.    Eyes: Negative for blurred vision and visual disturbance.   Cardiovascular: Negative for chest pain, claudication, dyspnea on exertion, irregular heartbeat, leg swelling, near-syncope, orthopnea, palpitations, paroxysmal nocturnal dyspnea and syncope.   Respiratory: Negative for cough, hemoptysis, shortness of breath, sleep disturbances due to breathing, snoring and wheezing.    Endocrine: Negative for cold intolerance and heat intolerance.   Hematologic/Lymphatic: Bruises/bleeds easily.   Skin: Negative for color change, dry skin and nail changes.   Musculoskeletal: Positive for arthritis and back pain. Negative for joint pain and myalgias.   Gastrointestinal: Negative for bloating, abdominal pain, constipation, nausea and vomiting.   Genitourinary: Negative for dysuria, flank pain, hematuria and hesitancy.   Neurological: Positive for focal weakness and headaches. Negative for light-headedness, loss of balance, numbness, paresthesias and weakness.   Psychiatric/Behavioral: Negative for altered mental status.   Allergic/Immunologic: Negative for environmental allergies.     Objective:     Vital Signs (Most Recent):  Temp: 97.6 °F (36.4 °C) (09/30/20 1319)  Pulse: 71 (09/30/20 1319)  Resp: 18 (09/30/20 1319)  BP: 120/62 (09/30/20 1319)  SpO2: (!) 94 % (09/30/20 1319) Vital Signs (24h Range):  Temp:  [97.5 °F (36.4 °C)-98.3 °F (36.8 °C)] 97.6 °F (36.4 °C)  Pulse:  [69-89] 71  Resp:  [14-19] 18  SpO2:  [94 %-99 %] 94 %  BP: (120-161)/(62-90) 120/62     Weight: 126.6 kg (279 lb)  Body mass index is 43.7 kg/m².    SpO2: (!) 94 %  O2 Device (Oxygen Therapy): room air      Intake/Output Summary (Last 24 hours) at 9/30/2020 1505  Last data filed at 9/30/2020 1200  Gross per 24 hour   Intake 300 ml   Output 320 ml   Net -20  ml       Lines/Drains/Airways     Peripheral Intravenous Line                 Peripheral IV - Single Lumen 20 G Right Forearm -- days                Physical Exam   Constitutional: She is oriented to person, place, and time. She appears well-developed and well-nourished. No distress.   HENT:   Head: Normocephalic and atraumatic.   Eyes: Pupils are equal, round, and reactive to light.   Neck: Normal range of motion and full passive range of motion without pain. Neck supple. No JVD present.   Cardiovascular: Normal rate, S1 normal, S2 normal and intact distal pulses. An irregular rhythm present. PMI is not displaced. Exam reveals no distant heart sounds.   No murmur heard.  Pulses:       Radial pulses are 2+ on the right side and 2+ on the left side.        Dorsalis pedis pulses are 2+ on the right side and 2+ on the left side.   S/P PPM implant   Pulmonary/Chest: Effort normal and breath sounds normal. No accessory muscle usage. No respiratory distress. She has no decreased breath sounds. She has no wheezes. She has no rales.   Abdominal: Soft. Bowel sounds are normal. She exhibits no distension. There is no abdominal tenderness.   Musculoskeletal: Normal range of motion.         General: No edema.      Right ankle: She exhibits swelling.      Left ankle: She exhibits swelling.   Neurological: She is alert and oriented to person, place, and time.   Skin: Skin is warm and dry. She is not diaphoretic. No cyanosis. Nails show no clubbing.   Psychiatric: She has a normal mood and affect. Her speech is normal and behavior is normal. Judgment and thought content normal. Cognition and memory are normal.   Nursing note and vitals reviewed.      Significant Labs:   BMP:   Recent Labs   Lab 09/30/20  0744   *      K 2.9*   CL 98   CO2 28   BUN 15   CREATININE 0.8   CALCIUM 9.1   MG 1.9   , CBC   Recent Labs   Lab 09/30/20  0744   WBC 8.71   HGB 14.1   HCT 42.3        Results for orders placed during the  hospital encounter of 09/29/20   Echo Color Flow Doppler? Yes; Bubble Contrast? Yes    Narrative · There is no evidence of intracardiac shunting.  · There is severe left ventricular concentric hypertrophy.  · The left ventricle is normal in sizeThe estimated ejection fraction is   50%.  · Normal left ventricular diastolic function.  · There is left ventricular global hypokinesis.  · Septal wall has abnormal motion consistent with post-operative status.  · Low normal right ventricular systolic function.  · Normal central venous pressure (3 mmHg).            Assessment and Plan:     * Subarachnoid hemorrhage  Mgmt per Neurology/neurosurgery    Orthostatic hypotension  Monitor BP closely    Hypomagnesemia  Keep Mag >2    Peripheral arterial occlusive disease  Continue ASA, Statin, Plavix    Tachycardia-bradycardia  S/p PPM implant    Hyperlipidemia  Continue statin    Hypothyroidism  -Has been started on synthroid  -mgmt per primary team    Diabetes mellitus  -mgmt per primary team    Atrial fibrillation  Continue Sotalol  Documented episode of AFIB on Pacemaker interrogation  Will need to start anticoagulation once ok per neuro  Per Neuro recs continue DAPT and ok to start AC in 2 weeks  Telemetry monitoring        VTE Risk Mitigation (From admission, onward)         Ordered     IP VTE HIGH RISK PATIENT  Once      09/30/20 0038     Place sequential compression device  Until discontinued      09/30/20 0038                Thank you for your consult. I will follow-up with patient. Please contact us if you have any additional questions.    TAYLOR Juárez-FABIAN  Cardiology   [facility]

## 2020-09-30 NOTE — PLAN OF CARE
Met with patient. Patient  lives alone and is independent with adls and iadls with assistive devices.  She ambulates with a straight cane.  Patient states she plans to go to her sister's home after discharge until she is able to care for herself.  Patient denies any post hospital needs or services at this time.  Updated white board with 's name and number. Transitional Care Folder, Discharge Planning Begins on Admission pamphlet, [facility] Bedside Delivery pamphlet, Advance Directive information given to patient along with the contact information given.Instructed patient or family to call with any questions or concerns.     D/c plan: home with sister  D/c transportation: family  Preferred Pharmacy:  [facility]  Bedside Pharmacy Delivery:  Yes  My [facility]: link sent  PCP:  Yoshi Cordero MD       09/30/20 5413   Discharge Assessment   Assessment Type Discharge Planning Assessment   Confirmed/corrected address and phone number on facesheet? Yes   Assessment information obtained from? Patient   Communicated expected length of stay with patient/caregiver yes   Prior to hospitilization cognitive status: Alert/Oriented   Prior to hospitalization functional status: Independent;Assistive Equipment   Current cognitive status: Alert/Oriented   Current Functional Status: Independent;Assistive Equipment   Facility Arrived From: [facility]   Lives With alone   Able to Return to Prior Arrangements yes   Is patient able to care for self after discharge? Yes   Who are your caregiver(s) and their phone number(s)? Amanda Garrett, sser [phone]   Patient's perception of discharge disposition home or selfcare   Readmission Within the Last 30 Days no previous admission in last 30 days   Patient currently being followed by outpatient case management? No   Patient currently receives any other outside agency services? No   Equipment Currently Used at Home cane, straight   Do you have  any problems affording any of your prescribed medications? No   Is the patient taking medications as prescribed? yes   Does the patient have transportation home? Yes   Transportation Anticipated family or friend will provide   Does the patient receive services at the [facility]? No   Discharge Plan A Home with family   Discharge Plan B Home Health   DME Needed Upon Discharge    (tbd)   Patient/Family in Agreement with Plan yes

## 2020-09-30 NOTE — PROGRESS NOTES
"[facility] Hospital Medicine  Progress Note    Patient Name: Monae Van  MRN: [id number]  Patient Class: IP- Inpatient   Admission Date: 9/29/2020  Length of Stay: 0 days  Attending Physician: Mckenzie Elise MD  Primary Care Provider: Yoshi Cordero MD    Subjective:     Principal Problem:Subarachnoid hemorrhage    HPI:  History was taken from patient and from review of PMH from [facility]. She says she cannot remember exact dates.  70 y.o. female patient with a PMHx of A-fib, CAD, CABG, cardiac stents, diabete, HTN, low back pain, pacemaker, Left CEA, who presented to the ER for evaluation of left-sided extremity weakness which onset gradually 4 days ago. She was seen at [facility] on 09/29/20 with chief complaint of left sided weakness  For 3 days She went to visit her sister 4 days ago and she feel and hit and her head and woke up with severe headache. CT head showed a small subarachnoid hemorrhage without falx effect.  She was transferred here for neurosurgery evaluation.  There was associated history of slurred speech and left sided weakness that was noticed by the  granddaughter too. Patient denies any n/v, visual disturbance, LOC, dizziness, back pain, neck pain, knee pain, hip pain, abdominal pain. She is on Plavix and Aspirin at home.    Overview/Hospital Course:  She reports [employer] pacemaker from 2017, MRI compatible. 6 days ago she felt weakness in her left leg which led to a fall, striking her head without loss of consciousness. Some on and off headache. She felt that both her left arm and leg were weak and some paresthesias. She just felt "off" and went to bed for 3-4 days, slept a lot, was able to ambulate to bathroom. Some initial slurred speech which has resolved.  No N/V,no visual loss or diplopia. Finally sought medical help and CT head with small falcine bleed, stable on repeat CT today without any evidence of stroke. No headache at this time, hungry. No dysphagia. No " CP or palpitations. Neurology consulted. Pacemaker interrogation for AFib pending. ECG shows atrial paced rythym. TSH 35.8      Interval History: PMaker interrogation pending. Started on Levothyroxine, OT/PT eval, ECHO pending.     Review of Systems   Constitutional: Positive for activity change. Negative for chills and fatigue.   HENT: Negative for congestion, ear pain, facial swelling, sinus pressure and sore throat.    Eyes: Negative for pain.   Respiratory: Negative for apnea, chest tightness, shortness of breath and stridor.    Cardiovascular: Negative for chest pain, palpitations and leg swelling.   Gastrointestinal: Negative for abdominal distention, abdominal pain, diarrhea and nausea.   Endocrine: Negative for polydipsia and polyphagia.   Genitourinary: Negative for decreased urine volume, difficulty urinating, frequency and genital sores.   Musculoskeletal: Negative for arthralgias and gait problem.   Neurological: Positive for speech difficulty (resolved), weakness (left sided) and headaches. Negative for light-headedness.   Hematological: Negative for adenopathy.   Psychiatric/Behavioral: Negative for agitation, confusion and decreased concentration.     Objective:     Vital Signs (Most Recent):  Temp: 97.6 °F (36.4 °C) (09/30/20 1319)  Pulse: 71 (09/30/20 1319)  Resp: 18 (09/30/20 1319)  BP: 120/62 (09/30/20 1319)  SpO2: (!) 94 % (09/30/20 1319) Vital Signs (24h Range):  Temp:  [97.5 °F (36.4 °C)-98.3 °F (36.8 °C)] 97.6 °F (36.4 °C)  Pulse:  [69-89] 71  Resp:  [14-19] 18  SpO2:  [94 %-99 %] 94 %  BP: (120-161)/(62-90) 120/62     Weight: 126.6 kg (279 lb)  Body mass index is 43.7 kg/m².    Intake/Output Summary (Last 24 hours) at 9/30/2020 1333  Last data filed at 9/30/2020 1200  Gross per 24 hour   Intake 300 ml   Output 320 ml   Net -20 ml      Physical Exam  Vitals signs and nursing note reviewed.   Constitutional:       Appearance: She is well-developed. She is obese.      Comments: Morbidly obese    HENT:      Head: Normocephalic and atraumatic.   Eyes:      Pupils: Pupils are equal, round, and reactive to light.   Neck:      Musculoskeletal: Normal range of motion and neck supple.      Thyroid: No thyromegaly.      Trachea: No tracheal deviation.   Cardiovascular:      Rate and Rhythm: Normal rate and regular rhythm.   Pulmonary:      Effort: No respiratory distress.      Breath sounds: No wheezing or rales.   Abdominal:      General: Bowel sounds are normal. There is no distension.      Palpations: Abdomen is soft.      Tenderness: There is no abdominal tenderness.   Skin:     General: Skin is warm and dry.      Coloration: Skin is not pale.   Neurological:      Mental Status: She is alert.      Cranial Nerves: No cranial nerve deficit.      Motor: Weakness (Left arm and left leg) present.      Coordination: Coordination normal.      Deep Tendon Reflexes: Reflexes are normal and symmetric.   Psychiatric:         Thought Content: Thought content normal.         Judgment: Judgment normal.         Significant Labs: All pertinent labs within the past 24 hours have been reviewed.  Results for orders placed or performed during the hospital encounter of 09/29/20   COVID-19 Rapid Screening   Result Value Ref Range    SARS-CoV-2 RNA, Amplification, Qual Negative Negative   CBC auto differential   Result Value Ref Range    WBC 8.71 3.90 - 12.70 K/uL    RBC 4.58 4.00 - 5.40 M/uL    Hemoglobin 14.1 12.0 - 16.0 g/dL    Hematocrit 42.3 37.0 - 48.5 %    Mean Corpuscular Volume 92 82 - 98 fL    Mean Corpuscular Hemoglobin 30.8 27.0 - 31.0 pg    Mean Corpuscular Hemoglobin Conc 33.3 32.0 - 36.0 g/dL    RDW 14.2 11.5 - 14.5 %    Platelets 245 150 - 350 K/uL    MPV 11.0 9.2 - 12.9 fL    Immature Granulocytes 0.7 (H) 0.0 - 0.5 %    Gran # (ANC) 6.3 1.8 - 7.7 K/uL    Immature Grans (Abs) 0.06 (H) 0.00 - 0.04 K/uL    Lymph # 1.7 1.0 - 4.8 K/uL    Mono # 0.5 0.3 - 1.0 K/uL    Eos # 0.1 0.0 - 0.5 K/uL    Baso # 0.02 0.00 - 0.20  K/uL    nRBC 0 0 /100 WBC    Gran% 72.5 38.0 - 73.0 %    Lymph% 19.7 18.0 - 48.0 %    Mono% 5.5 4.0 - 15.0 %    Eosinophil% 1.4 0.0 - 8.0 %    Basophil% 0.2 0.0 - 1.9 %    Differential Method Automated    Comprehensive metabolic panel   Result Value Ref Range    Sodium 142 136 - 145 mmol/L    Potassium 2.9 (L) 3.5 - 5.1 mmol/L    Chloride 98 95 - 110 mmol/L    CO2 28 23 - 29 mmol/L    Glucose 124 (H) 70 - 110 mg/dL    BUN, Bld 15 8 - 23 mg/dL    Creatinine 0.8 0.5 - 1.4 mg/dL    Calcium 9.1 8.7 - 10.5 mg/dL    Total Protein 6.5 6.0 - 8.4 g/dL    Albumin 3.8 3.5 - 5.2 g/dL    Total Bilirubin 0.6 0.1 - 1.0 mg/dL    Alkaline Phosphatase 69 55 - 135 U/L    AST 22 10 - 40 U/L    ALT 20 10 - 44 U/L    Anion Gap 16 8 - 16 mmol/L    eGFR if African American >60 >60 mL/min/1.73 m^2    eGFR if non African American >60 >60 mL/min/1.73 m^2   TSH   Result Value Ref Range    TSH 35.850 (H) 0.400 - 4.000 uIU/mL   Magnesium   Result Value Ref Range    Magnesium 1.9 1.6 - 2.6 mg/dL   T4, free   Result Value Ref Range    Free T4 <0.40 (L) 0.71 - 1.51 ng/dL   Echo Color Flow Doppler? Yes; Bubble Contrast? Yes   Result Value Ref Range    BSA 2.45 m2    LA WIDTH 3.67 cm    LVIDd 4.88 3.5 - 6.0 cm    IVS 1.68 (A) 0.6 - 1.1 cm    Posterior Wall 1.70 (A) 0.6 - 1.1 cm    Ao root annulus 3.31 cm    LVIDs 3.93 2.1 - 4.0 cm    FS 19 28 - 44 %    LA volume 57.34 cm3    Sinus 3.54 cm    STJ 3.45 cm    Ascending aorta 3.20 cm    LV mass 372.79 g    LA size 3.86 cm    TAPSE 1.53 cm    Left Ventricle Relative Wall Thickness 0.70 cm    AV mean gradient 2 mmHg    AV valve area 2.72 cm2    AV Velocity Ratio 0.82     AV index (prosthetic) 0.92     MV valve area p 1/2 method 2.97 cm2    PV peak gradient 1.93 mmHg    E/A ratio 0.83     E wave decelartion time 255.65 msec    IVRT 96.89 msec    LVOT diameter 1.94 cm    LVOT area 3.0 cm2    LVOT peak kulwinder 0.75 m/s    LVOT peak VTI 15.61 cm    Ao peak kulwinder 0.92 m/s    Ao VTI 16.94 cm    RVOT peak kulwinder 0.69 m/s     RVOT peak VTI 14.32 cm    Mr max zenon 0.04 m/s    LVOT stroke volume 46.12 cm3    AV peak gradient 3 mmHg    PV mean gradient 1.26 mmHg    MV Peak E Zenon 0.48 m/s    MV stenosis pressure 1/2 time 74.14 ms    MV Peak A Zenon 0.58 m/s    LV Systolic Volume 66.97 mL    LV Systolic Volume Index 28.8 mL/m2    LV Diastolic Volume 111.94 mL    LV Diastolic Volume Index 48.06 mL/m2    LA Volume Index 24.6 mL/m2    LV Mass Index 160 g/m2    RA Major Axis 3.65 cm    Left Atrium Minor Axis 4.88 cm    Left Atrium Major Axis 4.65 cm    RA Width 3.48 cm     Significant Imaging: I have reviewed all pertinent imaging results/findings within the past 24 hours.   Imaging Results    None         Assessment/Plan:      * Subarachnoid hemorrhage    Will follow neurosurgery .  Needs serial neuro checks.  Close BP monitoring -For most patients with acute SAH, the goal is to maintain systolic blood pressure (SBP) <160 mmHg or mean arterial pressure (MAP) <110 mmHg, as recommended by guidelines.  Will avoid hypotension .      Orthostatic hypotension    She is on midodrine at home -will resume.    Hypomagnesemia    Will check serum mag this morning and will adjust therapy     Hyperlipidemia    Continue Zocor    Hypothyroidism    Will resume synthroid -check TSH    Diabetes mellitus    Will continue insulin sliding scale, diabetic diet       Atrial fibrillation  -Interrogate [employer] Pacemaker: Pending  Will continue rate control with sotalol, hold aspirin/plavix   -ECHO pending      VTE Risk Mitigation (From admission, onward)         Ordered     IP VTE HIGH RISK PATIENT  Once      09/30/20 0038     Place sequential compression device  Until discontinued      09/30/20 0038                Discharge Planning   EVETTE:      Code Status: Full Code   Is the patient medically ready for discharge?:     Reason for patient still in hospital (select all that apply): Laboratory test, Consult recommendations and PT / OT recommendations, ECHO                      Michelle Mendez NP  Department of Hospital Medicine   [facility]

## 2020-09-30 NOTE — HPI
History was taken from patient and from review of PMH from [facility]. She says she cannot remember exact dates.  70 y.o. female patient with a PMHx of A-fib, CAD, CABG, cardiac stents, diabete, HTN, low back pain, pacemaker, Left CEA, who presented to the ER for evaluation of left-sided extremity weakness which onset gradually 4 days ago. She was seen at [facility] on 09/29/20 with chief complaint of left sided weakness  For 3 days She went to visit her sister 4 days ago and she feel and hit and her head and woke up with severe headache. CT head showed a small subarachnoid hemorrhage without falx effect.  She was transferred here for neurosurgery evaluation.  There was associated history of slurred speech and left sided weakness that was noticed by the  granddaughter too. Patient denies any n/v, visual disturbance, LOC, dizziness, back pain, neck pain, knee pain, hip pain, abdominal pain. She is on Plavix and Aspirin at home.

## 2020-09-30 NOTE — ED NOTES
"Pt presents to ED via [facility] transfer from [facility], for neuro consult dx of subarachnoid hemorrhage. Pt reports she fell 3 days ago when her L "leg gave out" d/t weakness as she was stepping up, family reports pt was slurring speech prior to fall. Pt reports that she takes ASA and Plavix, hx of Afib and CABG. Went to ED today after feeling weakness in her L arm and L leg, states "they said I was slurring my speech too", continue to report weakness to L side, no other complaints. No acute distress noted.  "

## 2020-09-30 NOTE — NURSING
Pacemaker interrogation completed. Reported to be working properly. Pt had 4 hours and 48 minutes of afib on 9/24. MD notified.

## 2020-09-30 NOTE — ASSESSMENT & PLAN NOTE
-Interrogate [employer] Pacemaker: Pending  Will continue rate control with sotalol, hold aspirin/plavix   -ECHO pending

## 2020-09-30 NOTE — CONSULTS
"Interval History:   Patient is a 70-year-old female with history of AFib on Plavix and aspirin and  pacemaker. coronary artery disease diabetes and hypertension.  She presented to [facility] with a 3 day history of having a headache as well as left-sided weakness.  She states that 3 days prior she felt her leg give out at the same time she developed some confusion and fell and hit her head.  No loss of consciousness or or seizure-like activity.  Since that time she" has not felt like herself."  She continues to complain of headaches, denies any nausea vomiting, seizure-like activity, visual difficulties.  Her left-sided weakness is slightly better however it is still present.  Has ambulated since the incident  Does not have history of prior strokes  Denies any bowel bladder symptoms    CT head done at an outside institution that I do not have for my review apparently showed some question of a subarachnoid blood along the falx.  Without any hydrocephalus.  Lateral ventricles were midline 3rd and 4th ventricle remain patent.    Medications:  Continuous Infusions:  Scheduled Meds:   levothyroxine  100 mcg Oral Before breakfast    simvastatin  40 mg Oral QHS    sotaloL  80 mg Oral Q12H     PRN Meds:dextrose 50%, dextrose 50%, glucagon (human recombinant), glucose, glucose, influenza, labetalol, sodium chloride 0.9%     Review of Systems   Constitutional: Negative for fatigue and fever.   HENT: Negative for congestion and hearing loss.    Eyes: Negative for pain.   Respiratory: Negative for shortness of breath.    Cardiovascular: Negative for chest pain.   Gastrointestinal: Negative for abdominal pain.   Genitourinary: Negative for difficulty urinating and urgency.   Skin: Negative for color change and pallor.   Neurological: Positive for dizziness, weakness and headaches. Negative for syncope.   Psychiatric/Behavioral: Positive for confusion.     Objective:     Weight: 126.8 kg (279 lb 9.6 oz)  Body mass " index is 43.79 kg/m².  Vital Signs (Most Recent):  Temp: 97.5 °F (36.4 °C) (09/30/20 0718)  Pulse: 71 (09/30/20 0718)  Resp: 18 (09/30/20 0718)  BP: 120/69 (09/30/20 0718)  SpO2: (!) 94 % (09/30/20 0718) Vital Signs (24h Range):  Temp:  [97.5 °F (36.4 °C)-98.3 °F (36.8 °C)] 97.5 °F (36.4 °C)  Pulse:  [70-89] 71  Resp:  [14-19] 18  SpO2:  [94 %-99 %] 94 %  BP: (120-161)/(62-90) 120/69                   Neurosurgery Physical Exam     Patient is awake and alert x3  Pupils equal and symmetric bilaterally  Extraocular movements are intact  No facial droop  Tongue midline  Patient does have slight drift on the left  FTN bilaterally intact    Nursing note and vitals reviewed  Gen:Oriented to person, place, and time.             Appears stated age   Head:Normocephalic and atraumatic.  Nose: Nose normal.    Eyes: EOM are normal. Pupils are equal, round, and reactive to light.   Neck: Neck supple. No masses or lesions palpated  Cardiovascular: Intact distal pulses.    Abdominal: Soft.   Neurological: Alert and oriented to person, place, and time.  No cranial nerve deficit.  Coordination normal. Normal muscle tone  Psychiatric: Normal mood and affect. Behavior is normal.    Motor   Right Right Left Left  Level Group   5  5 4+ C5 Deltoid   5  5 4+ C6 Bicep   5  5 4+  Wrist extension    5  5 4+ C7 Triceps   5  5 4+  Wrist flexion   5  5 4+ C8    5  5 4+ T1 Interossei    Sensation  NL Decreased (R/L/BL) Level Sensation    X  C5 Lateral upper arm   X  C6 Thumb and index finger, lat forearm   X  C7 Middle finger   X  C8 Ring and little finger        Significant Labs:  No results for input(s): GLU, NA, K, CL, CO2, BUN, CREATININE, CALCIUM, MG in the last 48 hours.  No results for input(s): WBC, HGB, HCT, PLT in the last 48 hours.  No results for input(s): LABPT, INR, APTT in the last 48 hours.  Microbiology Results (last 7 days)     ** No results found for the last 168 hours. **        All pertinent labs from the last 24 hours  have been reviewed.    Significant Diagnostics:  I have reviewed all pertinent imaging results/findings within the past 24 hours.     Plan  At this time imaging is not available for my review  Sounds like she had some sort of TIA  Unfortunately she is unable to have a MRI  CT scan getting done today  Possible CTA however labs are not completed at this time  Will make further recommendations following  Neurology consulted as well

## 2020-09-30 NOTE — SUBJECTIVE & OBJECTIVE
Interval History: PMaker interrogation pending. Started on Levothyroxine, OT/PT eval, ECHO pending.     Review of Systems   Constitutional: Positive for activity change. Negative for chills and fatigue.   HENT: Negative for congestion, ear pain, facial swelling, sinus pressure and sore throat.    Eyes: Negative for pain.   Respiratory: Negative for apnea, chest tightness, shortness of breath and stridor.    Cardiovascular: Negative for chest pain, palpitations and leg swelling.   Gastrointestinal: Negative for abdominal distention, abdominal pain, diarrhea and nausea.   Endocrine: Negative for polydipsia and polyphagia.   Genitourinary: Negative for decreased urine volume, difficulty urinating, frequency and genital sores.   Musculoskeletal: Negative for arthralgias and gait problem.   Neurological: Positive for speech difficulty (resolved), weakness (left sided) and headaches. Negative for light-headedness.   Hematological: Negative for adenopathy.   Psychiatric/Behavioral: Negative for agitation, confusion and decreased concentration.     Objective:     Vital Signs (Most Recent):  Temp: 97.6 °F (36.4 °C) (09/30/20 1319)  Pulse: 71 (09/30/20 1319)  Resp: 18 (09/30/20 1319)  BP: 120/62 (09/30/20 1319)  SpO2: (!) 94 % (09/30/20 1319) Vital Signs (24h Range):  Temp:  [97.5 °F (36.4 °C)-98.3 °F (36.8 °C)] 97.6 °F (36.4 °C)  Pulse:  [69-89] 71  Resp:  [14-19] 18  SpO2:  [94 %-99 %] 94 %  BP: (120-161)/(62-90) 120/62     Weight: 126.6 kg (279 lb)  Body mass index is 43.7 kg/m².    Intake/Output Summary (Last 24 hours) at 9/30/2020 1333  Last data filed at 9/30/2020 1200  Gross per 24 hour   Intake 300 ml   Output 320 ml   Net -20 ml      Physical Exam  Vitals signs and nursing note reviewed.   Constitutional:       Appearance: She is well-developed. She is obese.      Comments: Morbidly obese   HENT:      Head: Normocephalic and atraumatic.   Eyes:      Pupils: Pupils are equal, round, and reactive to light.   Neck:       Musculoskeletal: Normal range of motion and neck supple.      Thyroid: No thyromegaly.      Trachea: No tracheal deviation.   Cardiovascular:      Rate and Rhythm: Normal rate and regular rhythm.   Pulmonary:      Effort: No respiratory distress.      Breath sounds: No wheezing or rales.   Abdominal:      General: Bowel sounds are normal. There is no distension.      Palpations: Abdomen is soft.      Tenderness: There is no abdominal tenderness.   Skin:     General: Skin is warm and dry.      Coloration: Skin is not pale.   Neurological:      Mental Status: She is alert.      Cranial Nerves: No cranial nerve deficit.      Motor: Weakness (Left arm and left leg) present.      Coordination: Coordination normal.      Deep Tendon Reflexes: Reflexes are normal and symmetric.   Psychiatric:         Thought Content: Thought content normal.         Judgment: Judgment normal.         Significant Labs: All pertinent labs within the past 24 hours have been reviewed.  Results for orders placed or performed during the hospital encounter of 09/29/20   COVID-19 Rapid Screening   Result Value Ref Range    SARS-CoV-2 RNA, Amplification, Qual Negative Negative   CBC auto differential   Result Value Ref Range    WBC 8.71 3.90 - 12.70 K/uL    RBC 4.58 4.00 - 5.40 M/uL    Hemoglobin 14.1 12.0 - 16.0 g/dL    Hematocrit 42.3 37.0 - 48.5 %    Mean Corpuscular Volume 92 82 - 98 fL    Mean Corpuscular Hemoglobin 30.8 27.0 - 31.0 pg    Mean Corpuscular Hemoglobin Conc 33.3 32.0 - 36.0 g/dL    RDW 14.2 11.5 - 14.5 %    Platelets 245 150 - 350 K/uL    MPV 11.0 9.2 - 12.9 fL    Immature Granulocytes 0.7 (H) 0.0 - 0.5 %    Gran # (ANC) 6.3 1.8 - 7.7 K/uL    Immature Grans (Abs) 0.06 (H) 0.00 - 0.04 K/uL    Lymph # 1.7 1.0 - 4.8 K/uL    Mono # 0.5 0.3 - 1.0 K/uL    Eos # 0.1 0.0 - 0.5 K/uL    Baso # 0.02 0.00 - 0.20 K/uL    nRBC 0 0 /100 WBC    Gran% 72.5 38.0 - 73.0 %    Lymph% 19.7 18.0 - 48.0 %    Mono% 5.5 4.0 - 15.0 %    Eosinophil% 1.4 0.0 -  8.0 %    Basophil% 0.2 0.0 - 1.9 %    Differential Method Automated    Comprehensive metabolic panel   Result Value Ref Range    Sodium 142 136 - 145 mmol/L    Potassium 2.9 (L) 3.5 - 5.1 mmol/L    Chloride 98 95 - 110 mmol/L    CO2 28 23 - 29 mmol/L    Glucose 124 (H) 70 - 110 mg/dL    BUN, Bld 15 8 - 23 mg/dL    Creatinine 0.8 0.5 - 1.4 mg/dL    Calcium 9.1 8.7 - 10.5 mg/dL    Total Protein 6.5 6.0 - 8.4 g/dL    Albumin 3.8 3.5 - 5.2 g/dL    Total Bilirubin 0.6 0.1 - 1.0 mg/dL    Alkaline Phosphatase 69 55 - 135 U/L    AST 22 10 - 40 U/L    ALT 20 10 - 44 U/L    Anion Gap 16 8 - 16 mmol/L    eGFR if African American >60 >60 mL/min/1.73 m^2    eGFR if non African American >60 >60 mL/min/1.73 m^2   TSH   Result Value Ref Range    TSH 35.850 (H) 0.400 - 4.000 uIU/mL   Magnesium   Result Value Ref Range    Magnesium 1.9 1.6 - 2.6 mg/dL   T4, free   Result Value Ref Range    Free T4 <0.40 (L) 0.71 - 1.51 ng/dL   Echo Color Flow Doppler? Yes; Bubble Contrast? Yes   Result Value Ref Range    BSA 2.45 m2    LA WIDTH 3.67 cm    LVIDd 4.88 3.5 - 6.0 cm    IVS 1.68 (A) 0.6 - 1.1 cm    Posterior Wall 1.70 (A) 0.6 - 1.1 cm    Ao root annulus 3.31 cm    LVIDs 3.93 2.1 - 4.0 cm    FS 19 28 - 44 %    LA volume 57.34 cm3    Sinus 3.54 cm    STJ 3.45 cm    Ascending aorta 3.20 cm    LV mass 372.79 g    LA size 3.86 cm    TAPSE 1.53 cm    Left Ventricle Relative Wall Thickness 0.70 cm    AV mean gradient 2 mmHg    AV valve area 2.72 cm2    AV Velocity Ratio 0.82     AV index (prosthetic) 0.92     MV valve area p 1/2 method 2.97 cm2    PV peak gradient 1.93 mmHg    E/A ratio 0.83     E wave decelartion time 255.65 msec    IVRT 96.89 msec    LVOT diameter 1.94 cm    LVOT area 3.0 cm2    LVOT peak kulwinder 0.75 m/s    LVOT peak VTI 15.61 cm    Ao peak kulwinder 0.92 m/s    Ao VTI 16.94 cm    RVOT peak kulwinder 0.69 m/s    RVOT peak VTI 14.32 cm    Mr max kulwinder 0.04 m/s    LVOT stroke volume 46.12 cm3    AV peak gradient 3 mmHg    PV mean gradient 1.26  mmHg    MV Peak E Zenon 0.48 m/s    MV stenosis pressure 1/2 time 74.14 ms    MV Peak A Zenon 0.58 m/s    LV Systolic Volume 66.97 mL    LV Systolic Volume Index 28.8 mL/m2    LV Diastolic Volume 111.94 mL    LV Diastolic Volume Index 48.06 mL/m2    LA Volume Index 24.6 mL/m2    LV Mass Index 160 g/m2    RA Major Axis 3.65 cm    Left Atrium Minor Axis 4.88 cm    Left Atrium Major Axis 4.65 cm    RA Width 3.48 cm     Significant Imaging: I have reviewed all pertinent imaging results/findings within the past 24 hours.   Imaging Results    None

## 2020-09-30 NOTE — ED PROVIDER NOTES
SCRIBE #1 NOTE: I, Brenda Dennis, am scribing for, and in the presence of, Silva Ordoñez MD. I have scribed the entire note.       History     Chief Complaint   Patient presents with    Extremity Weakness     Transfer , Subarachnoid bleed, s/p fall 3 days     Review of patient's allergies indicates:   Allergen Reactions    Avelox [moxifloxacin]     Hydrocodone     Morpholine analogues     Panlor (hydrocodone-acetamin)          History of Present Illness     HPI    9/29/2020, 11:46 PM  History obtained from the [facility] and patient      History of Present Illness: Monae Van is a 70 y.o. female patient with a PMHx of A-fib, CAD, diabetes, and HTN who presents to the Emergency Department for evaluation of left-sided extremity weakness which onset gradually 4 days ago. [place] after a fall causing a subarachnoid bleed. Pt's granddaughter reports that she fell about 3 days ago and hit her head. Pt's daughter noticed some slurred speech and left-sided extremity weakness prior to the fall. Symptoms are constant and moderate in severity. No mitigating or exacerbating factors reported. Associated sxs include mild HA. Patient denies any n/v, visual disturbance, LOC, dizziness, back pain, neck pain, knee pain, hip pain, abdominal pain, CP, SOB, and all other sxs at this time. No prior Tx reported. No further complaints or concerns at this time.       Arrival mode: Ambulance Service    PCP: Yoshi Cordero MD        Past Medical History:  Past Medical History:   Diagnosis Date    A-fib     CAD (coronary artery disease)     COPD (chronic obstructive pulmonary disease)     Diabetes     HTN (hypertension)     Pacemaker     [facility]: MRI Compatible    PAD (peripheral artery disease)     S/P CABG (coronary artery bypass graft) 2017    S/P carotid endarterectomy 2015    left       Past Surgical History:  Past Surgical History:   Procedure Laterality Date     APPENDECTOMY      cardiac stents      CHOLECYSTECTOMY      CORONARY ARTERY BYPASS GRAFT      HYSTERECTOMY      THYROID SURGERY           Family History:  History reviewed. No pertinent family history.       Social History:  Social History     Tobacco Use    Smoking status: Former Smoker    Smokeless tobacco: Never Used   Substance and Sexual Activity    Alcohol use: Never     Frequency: Never    Drug use: Never    Sexual activity: Unknown        Review of Systems     Review of Systems   Constitutional: Negative for fever.   HENT: Negative for sore throat.         +head trauma   Eyes: Negative for visual disturbance.   Respiratory: Negative for shortness of breath.    Cardiovascular: Negative for chest pain.   Gastrointestinal: Negative for abdominal pain, nausea and vomiting.   Genitourinary: Negative for dysuria.   Musculoskeletal: Negative for arthralgias (knee pain, hip pain), back pain and neck pain.   Skin: Negative for rash.   Neurological: Positive for speech difficulty (slurred speech), weakness (Left-sided extremity weakness) and headaches. Negative for dizziness.   Hematological: Does not bruise/bleed easily.   All other systems reviewed and are negative.       Physical Exam     Initial Vitals [09/29/20 2344]   BP Pulse Resp Temp SpO2   (!) 152/84 75 16 98.3 °F (36.8 °C) 99 %      MAP       --          Physical Exam  Nursing Notes and Vital Signs Reviewed.  Constitutional: Patient is in no acute distress. Well-developed and well-nourished.  Head: Atraumatic. Normocephalic.  Eyes: PERRL. EOM intact. Conjunctivae are not pale. No scleral icterus.  ENT: Mucous membranes are moist. Oropharynx is clear and symmetric.    Neck: Supple. Full ROM. No lymphadenopathy.  Cardiovascular: Regular rate. Regular rhythm. No murmurs, rubs, or gallops. Distal pulses are 2+ and symmetric.  Pulmonary/Chest: No respiratory distress. Clear to auscultation bilaterally. No wheezing or rales.  Abdominal: Soft and  "non-distended.  There is no tenderness.  No rebound, guarding, or rigidity. Good bowel sounds.  Genitourinary: No CVA tenderness  Musculoskeletal: Moves all extremities. No obvious deformities. No edema. No calf tenderness.  Skin: Warm and dry.  Neurological: Patient is alert and oriented to person, place and time. Pupils ERRL and EOM normal. Cranial nerves II-XII are intact. Strength is 5/5 in RUE and RLE. Strength is 3/5 in LUE and LLE. There is no pronator drift of outstretched arms. Light touch sense is intact. Speech is clear and normal. No acute focal neurological deficits noted.  Psychiatric: Normal affect. Good eye contact. Appropriate in content.     ED Course   Procedures  ED Vital Signs:  Vitals:    09/29/20 2347 09/30/20 0001 09/30/20 0048 09/30/20 0100   BP:  135/62 123/64 133/82   Pulse:  74 71 89   Resp:  19 14 16   Temp:       TempSrc:       SpO2:  96% 95% 95%   Weight: 126.8 kg (279 lb 9.6 oz)      Height: 5' 7" (1.702 m)       09/30/20 0135 09/30/20 0418 09/30/20 0511 09/30/20 0718   BP: (!) 150/90 (!) 161/87 (!) 141/85 120/69   Pulse: 70 70  71   Resp: 18 18  18   Temp: 97.5 °F (36.4 °C) 97.6 °F (36.4 °C)  97.5 °F (36.4 °C)   TempSrc: Oral Oral  Oral   SpO2: 97% (!) 94%  (!) 94%   Weight:    126.6 kg (279 lb)   Height:    5' 7" (1.702 m)    09/30/20 0900 09/30/20 1115 09/30/20 1307 09/30/20 1319   BP:    120/62   Pulse: 69 69 87 71   Resp:    18   Temp:    97.6 °F (36.4 °C)   TempSrc:    Oral   SpO2:    (!) 94%   Weight:       Height:        09/30/20 1532 09/30/20 1559 09/30/20 1717   BP:  131/60    Pulse: 75 71 77   Resp:  18    Temp:  98 °F (36.7 °C)    TempSrc:  Oral    SpO2:  96%    Weight:      Height:          Abnormal Lab Results:  Labs Reviewed   SARS-COV-2 RNA AMPLIFICATION, QUAL        All Lab Results:  Results for orders placed or performed during the hospital encounter of 09/29/20   COVID-19 Rapid Screening   Result Value Ref Range    SARS-CoV-2 RNA, Amplification, Qual Negative " Negative   CBC auto differential   Result Value Ref Range    WBC 8.71 3.90 - 12.70 K/uL    RBC 4.58 4.00 - 5.40 M/uL    Hemoglobin 14.1 12.0 - 16.0 g/dL    Hematocrit 42.3 37.0 - 48.5 %    Mean Corpuscular Volume 92 82 - 98 fL    Mean Corpuscular Hemoglobin 30.8 27.0 - 31.0 pg    Mean Corpuscular Hemoglobin Conc 33.3 32.0 - 36.0 g/dL    RDW 14.2 11.5 - 14.5 %    Platelets 245 150 - 350 K/uL    MPV 11.0 9.2 - 12.9 fL    Immature Granulocytes 0.7 (H) 0.0 - 0.5 %    Gran # (ANC) 6.3 1.8 - 7.7 K/uL    Immature Grans (Abs) 0.06 (H) 0.00 - 0.04 K/uL    Lymph # 1.7 1.0 - 4.8 K/uL    Mono # 0.5 0.3 - 1.0 K/uL    Eos # 0.1 0.0 - 0.5 K/uL    Baso # 0.02 0.00 - 0.20 K/uL    nRBC 0 0 /100 WBC    Gran% 72.5 38.0 - 73.0 %    Lymph% 19.7 18.0 - 48.0 %    Mono% 5.5 4.0 - 15.0 %    Eosinophil% 1.4 0.0 - 8.0 %    Basophil% 0.2 0.0 - 1.9 %    Differential Method Automated    Comprehensive metabolic panel   Result Value Ref Range    Sodium 142 136 - 145 mmol/L    Potassium 2.9 (L) 3.5 - 5.1 mmol/L    Chloride 98 95 - 110 mmol/L    CO2 28 23 - 29 mmol/L    Glucose 124 (H) 70 - 110 mg/dL    BUN, Bld 15 8 - 23 mg/dL    Creatinine 0.8 0.5 - 1.4 mg/dL    Calcium 9.1 8.7 - 10.5 mg/dL    Total Protein 6.5 6.0 - 8.4 g/dL    Albumin 3.8 3.5 - 5.2 g/dL    Total Bilirubin 0.6 0.1 - 1.0 mg/dL    Alkaline Phosphatase 69 55 - 135 U/L    AST 22 10 - 40 U/L    ALT 20 10 - 44 U/L    Anion Gap 16 8 - 16 mmol/L    eGFR if African American >60 >60 mL/min/1.73 m^2    eGFR if non African American >60 >60 mL/min/1.73 m^2   TSH   Result Value Ref Range    TSH 35.850 (H) 0.400 - 4.000 uIU/mL   Magnesium   Result Value Ref Range    Magnesium 1.9 1.6 - 2.6 mg/dL   T4, free   Result Value Ref Range    Free T4 <0.40 (L) 0.71 - 1.51 ng/dL   Echo Color Flow Doppler? Yes; Bubble Contrast? Yes   Result Value Ref Range    BSA 2.45 m2    LA WIDTH 3.67 cm    LVIDd 4.88 3.5 - 6.0 cm    IVS 1.68 (A) 0.6 - 1.1 cm    Posterior Wall 1.70 (A) 0.6 - 1.1 cm    Ao root annulus  3.31 cm    LVIDs 3.93 2.1 - 4.0 cm    FS 19 28 - 44 %    LA volume 57.34 cm3    Sinus 3.54 cm    STJ 3.45 cm    Ascending aorta 3.20 cm    LV mass 372.79 g    LA size 3.86 cm    TAPSE 1.53 cm    Left Ventricle Relative Wall Thickness 0.70 cm    AV mean gradient 2 mmHg    AV valve area 2.72 cm2    AV Velocity Ratio 0.82     AV index (prosthetic) 0.92     MV valve area p 1/2 method 2.97 cm2    E/A ratio 0.83     E wave decelartion time 255.65 msec    IVRT 96.89 msec    LVOT diameter 1.94 cm    LVOT area 3.0 cm2    LVOT peak zenon 0.75 m/s    LVOT peak VTI 15.61 cm    Ao peak zenon 0.92 m/s    Ao VTI 16.94 cm    RVOT peak zenon 0.69 m/s    RVOT peak VTI 14.32 cm    Mr max zenon 0.04 m/s    LVOT stroke volume 46.12 cm3    AV peak gradient 3 mmHg    PV mean gradient 1.26 mmHg    MV Peak E Zenon 0.48 m/s    MV stenosis pressure 1/2 time 74.14 ms    MV Peak A Zenon 0.58 m/s    LV Systolic Volume 66.97 mL    LV Systolic Volume Index 28.8 mL/m2    LV Diastolic Volume 111.94 mL    LV Diastolic Volume Index 48.06 mL/m2    LA Volume Index 24.6 mL/m2    LV Mass Index 160 g/m2    RA Major Axis 3.65 cm    Left Atrium Minor Axis 4.88 cm    Left Atrium Major Axis 4.65 cm    RA Width 3.48 cm    Right Atrial Pressure (from IVC) 3 mmHg         Imaging Results:  Imaging Results    None                     The Emergency Provider reviewed the vital signs and test results, which are outlined above.     ED Discussion     12:09 AM: Discussed case with Kimberly Joe NP ([facility]). Dr. Cooper agrees with current care and management of pt and accepts admission.   Admitting Service: Hospital Medicine  Admitting Physician: Dr. Cooper  Admit to: Obs/tele    12:15 AM: Re-evaluated pt. I have discussed test results, shared treatment plan, and the need for admission with patient and family at bedside. Pt and family express understanding at this time and agree with all information. All questions answered. Pt and family have no further questions or  concerns at this time. Pt is ready for admit.             Medical Decision Making:   Clinical Tests:   Lab Tests: Ordered and Reviewed           ED Medication(s):  Medications   levothyroxine tablet 100 mcg (100 mcg Oral Given 9/30/20 0536)   sotaloL tablet 80 mg (80 mg Oral Given 9/30/20 1029)   sodium chloride 0.9% flush 10 mL (has no administration in time range)   glucose chewable tablet 16 g (has no administration in time range)   glucose chewable tablet 24 g (has no administration in time range)   dextrose 50% injection 12.5 g (has no administration in time range)   dextrose 50% injection 25 g (has no administration in time range)   glucagon (human recombinant) injection 1 mg (has no administration in time range)   influenza (QUADRIVALENT ADJUVANTED PF) vaccine 0.5 mL (has no administration in time range)   labetaloL injection 10 mg (has no administration in time range)   simvastatin tablet 60 mg (has no administration in time range)   aspirin EC tablet 81 mg (has no administration in time range)   clopidogreL tablet 75 mg (has no administration in time range)   perflutren protein-A microsphr 0.22 mg/mL IV susp (0.11 mg Intravenous Not Given 9/30/20 1330)       Current Discharge Medication List                  Scribe Attestation:   Scribe #1: I performed the above scribed service and the documentation accurately describes the services I performed. I attest to the accuracy of the note.     Attending:   Physician Attestation Statement for Scribe #1: I, Silva Ordoñez MD, personally performed the services described in this documentation, as scribed by Brenda Dennis, in my presence, and it is both accurate and complete.           Clinical Impression       ICD-10-CM ICD-9-CM   1. Subarachnoid hemorrhage  I60.9 430   2. Stroke due to thrombosis of right middle cerebral artery  I63.311 434.01   3. A-fib  I48.91 427.31   4. Paroxysmal atrial fibrillation  I48.0 427.31       Disposition:   Disposition: Placed in  Observation  Condition: Mani Ordoñez MD  09/30/20 2051

## 2020-09-30 NOTE — SUBJECTIVE & OBJECTIVE
Past Medical History:   Diagnosis Date    A-fib     CAD (coronary artery disease)     COPD (chronic obstructive pulmonary disease)     Diabetes     HTN (hypertension)     Pacemaker     PAD (peripheral artery disease)        Past Surgical History:   Procedure Laterality Date    APPENDECTOMY      cardiac stents      CHOLECYSTECTOMY      CORONARY ARTERY BYPASS GRAFT      HYSTERECTOMY      THYROID SURGERY         Review of patient's allergies indicates:   Allergen Reactions    Avelox [moxifloxacin]     Hydrocodone     Morpholine analogues     Panlor (hydrocodone-acetamin)        No current facility-administered medications on file prior to encounter.      Current Outpatient Medications on File Prior to Encounter   Medication Sig    aspirin (ECOTRIN) 81 MG EC tablet Take 81 mg by mouth once daily.    celecoxib (CELEBREX) 200 MG capsule Take 200 mg by mouth.    citalopram (CELEXA) 40 MG tablet Take 40 mg by mouth once daily.    clopidogreL (PLAVIX) 75 mg tablet Take 75 mg by mouth once daily.    gabapentin (NEURONTIN) 300 MG capsule Take 300 mg by mouth 3 (three) times daily.    levothyroxine 150 mcg Cap Take by mouth.    metFORMIN (GLUCOPHAGE) 1000 MG tablet Take 1,000 mg by mouth 2 (two) times daily with meals.    simvastatin (ZOCOR) 40 MG tablet Take 40 mg by mouth every evening.    isosorbide mononitrate (ISMO,MONOKET) 10 mg tablet Take 10 mg by mouth 2 (two) times daily.    magnesium oxide (MAG-OX) 400 mg (241.3 mg magnesium) tablet Take 400 mg by mouth once daily.    midodrine (PROAMATINE) 5 MG Tab Take 2.5 mg by mouth 2 (two) times daily with meals.    oxybutynin (DITROPAN-XL) 5 MG TR24 Take 5 mg by mouth once daily.    potassium chloride (KLOR-CON) 8 MEQ TbSR Take 8 mEq by mouth once.    sotaloL (BETAPACE) 120 MG Tab Take 80 mg by mouth every 12 (twelve) hours.     Family History     None        Tobacco Use    Smoking status: Former Smoker    Smokeless tobacco: Never Used    Substance and Sexual Activity    Alcohol use: Never     Frequency: Never    Drug use: Never    Sexual activity: Not on file     Review of Systems   Constitutional: Positive for activity change. Negative for chills and fatigue.   HENT: Negative for congestion, ear pain, facial swelling, sinus pressure and sore throat.    Eyes: Negative for pain.   Respiratory: Negative for apnea, chest tightness, shortness of breath and stridor.    Cardiovascular: Negative for chest pain, palpitations and leg swelling.   Gastrointestinal: Negative for abdominal distention, abdominal pain, diarrhea and nausea.   Endocrine: Negative for polydipsia and polyphagia.   Genitourinary: Negative for decreased urine volume, difficulty urinating, frequency and genital sores.   Musculoskeletal: Negative for arthralgias and gait problem.   Neurological: Negative for light-headedness and headaches.   Hematological: Negative for adenopathy.   Psychiatric/Behavioral: Negative for agitation, confusion and decreased concentration.     Objective:     Vital Signs (Most Recent):  Temp: 97.5 °F (36.4 °C) (09/30/20 0718)  Pulse: 71 (09/30/20 0718)  Resp: 18 (09/30/20 0718)  BP: 120/69 (09/30/20 0718)  SpO2: (!) 94 % (09/30/20 0718) Vital Signs (24h Range):  Temp:  [97.5 °F (36.4 °C)-98.3 °F (36.8 °C)] 97.5 °F (36.4 °C)  Pulse:  [70-89] 71  Resp:  [14-19] 18  SpO2:  [94 %-99 %] 94 %  BP: (120-161)/(62-90) 120/69     Weight: 126.8 kg (279 lb 9.6 oz)  Body mass index is 43.79 kg/m².    Physical Exam  Vitals signs and nursing note reviewed.   Constitutional:       Appearance: She is well-developed.   HENT:      Head: Normocephalic and atraumatic.   Eyes:      Pupils: Pupils are equal, round, and reactive to light.   Neck:      Musculoskeletal: Normal range of motion and neck supple.      Thyroid: No thyromegaly.      Trachea: No tracheal deviation.   Cardiovascular:      Rate and Rhythm: Normal rate and regular rhythm.   Pulmonary:      Effort: No  respiratory distress.      Breath sounds: No wheezing or rales.   Abdominal:      General: Bowel sounds are normal. There is no distension.      Palpations: Abdomen is soft.      Tenderness: There is no abdominal tenderness.   Skin:     General: Skin is warm and dry.      Coloration: Skin is not pale.   Neurological:      Mental Status: She is alert. Mental status is at baseline.      Cranial Nerves: No cranial nerve deficit.      Coordination: Coordination normal.      Deep Tendon Reflexes: Reflexes are normal and symmetric.   Psychiatric:         Thought Content: Thought content normal.         Judgment: Judgment normal.          Significant Labs: BMP: No results for input(s): GLU, NA, K, CL, CO2, BUN, CREATININE, CALCIUM, MG in the last 48 hours.  CBC: No results for input(s): WBC, HGB, HCT, PLT in the last 48 hours.  All pertinent labs within the past 24 hours have been reviewed.    Significant Imaging: I have reviewed and interpreted all pertinent imaging results/findings within the past 24 hours.

## 2020-09-30 NOTE — CONSULTS
"[facility]  Consult Note    Patient Name: Monae Van  MRN: [id number]  Admission Date: 9/29/2020  Hospital Length of Stay: 0 days  Attending Physician: Mckenzie Elise MD   Primary Care Provider: Yoshi Cordero MD           Patient information was obtained from patient and ER records.     Consults  Subjective:     Principal Problem:Subarachnoid hemorrhage, left sided weakness    Chief Complaint:   Chief Complaint   Patient presents with    Extremity Weakness     Transfer , Subarachnoid bleed, s/p fall 3 days        HPI: 71 yo right handed WF with past history of CABG 2007 at which time she was found in atrial fibrillation post-op, never has had any sense of fast or irregular heart rate. Never treated with anti-coagulation.  Had a left CEA over 5 years ago, thinks it was found incidentally without any clear symptoms. Had cardiac stents placed 2 years ago and has been taking dual antiplatelet therapy since. Also taking simvastatin 60 mg/day with intolerance to Lipitor due to muscle aches.  Has a pacemaker from 2017, MRI compatible.  6 days ago she felt weakness in her left leg which led to a fall, striking her head without loss of consciousness. Some on and off headache. She felt that both her left arm and leg were weak and some paresthesias. She just felt "off" and went to bed for 3-4 days, slept a lot, was able to ambulate to bathroom. Some initial slurred speech which has resolved.  No N/V,no visual loss or diplopia. Finally sought medical help and CT head with small falcine bleed, stable on repeat CT today without any evidence of stroke. No headache at this time, hungry. No dysphagia. No CP or palpitations.  Has had chronic low back pain with some radiation into gluteal area, tried epidural steroid once with more pain, never again.   Quit tobacco 30 years ago.    Past Medical History:   Diagnosis Date    A-fib     CAD (coronary artery disease)     COPD (chronic obstructive " pulmonary disease)     Diabetes     HTN (hypertension)     Pacemaker     PAD (peripheral artery disease)        Past Surgical History:   Procedure Laterality Date    APPENDECTOMY      cardiac stents      CHOLECYSTECTOMY      CORONARY ARTERY BYPASS GRAFT      HYSTERECTOMY      THYROID SURGERY         Review of patient's allergies indicates:   Allergen Reactions    Avelox [moxifloxacin]     Hydrocodone     Morpholine analogues     Panlor (hydrocodone-acetamin)        No current facility-administered medications on file prior to encounter.      Current Outpatient Medications on File Prior to Encounter   Medication Sig    aspirin (ECOTRIN) 81 MG EC tablet Take 81 mg by mouth once daily.    celecoxib (CELEBREX) 200 MG capsule Take 200 mg by mouth.    citalopram (CELEXA) 40 MG tablet Take 40 mg by mouth once daily.    clopidogreL (PLAVIX) 75 mg tablet Take 75 mg by mouth once daily.    gabapentin (NEURONTIN) 300 MG capsule Take 300 mg by mouth 3 (three) times daily.    levothyroxine 150 mcg Cap Take by mouth.    metFORMIN (GLUCOPHAGE) 1000 MG tablet Take 1,000 mg by mouth 2 (two) times daily with meals.    simvastatin (ZOCOR) 40 MG tablet Take 40 mg by mouth every evening.    isosorbide mononitrate (ISMO,MONOKET) 10 mg tablet Take 10 mg by mouth 2 (two) times daily.    magnesium oxide (MAG-OX) 400 mg (241.3 mg magnesium) tablet Take 400 mg by mouth once daily.    midodrine (PROAMATINE) 5 MG Tab Take 2.5 mg by mouth 2 (two) times daily with meals.    oxybutynin (DITROPAN-XL) 5 MG TR24 Take 5 mg by mouth once daily.    potassium chloride (KLOR-CON) 8 MEQ TbSR Take 8 mEq by mouth once.    sotaloL (BETAPACE) 120 MG Tab Take 80 mg by mouth every 12 (twelve) hours.     Family History     None        Tobacco Use    Smoking status: Former Smoker    Smokeless tobacco: Never Used   Substance and Sexual Activity    Alcohol use: Never     Frequency: Never    Drug use: Never    Sexual activity: Not on  file     Review of Systems 14 point ROS carried out and negative aside from HPI.  Objective:     Vital Signs (Most Recent):  Temp: 97.5 °F (36.4 °C) (09/30/20 0718)  Pulse: 71 (09/30/20 0718)  Resp: 18 (09/30/20 0718)  BP: 120/69 (09/30/20 0718)  SpO2: (!) 94 % (09/30/20 0718) Vital Signs (24h Range):  Temp:  [97.5 °F (36.4 °C)-98.3 °F (36.8 °C)] 97.5 °F (36.4 °C)  Pulse:  [70-89] 71  Resp:  [14-19] 18  SpO2:  [94 %-99 %] 94 %  BP: (120-161)/(62-90) 120/69     Weight: 126.8 kg (279 lb 9.6 oz)  Body mass index is 43.79 kg/m².    Physical Exam   Obese WF in no distress  Awake, alert  Speech clear  Oriented x 3  Visual field: full  EOM: intact  CN V, VII, XII: intact  Motor: no pronator drift             No arm dysmetria             No weakness upper extremities             Left iliopsoas 4/5, anterior tibial, gastrocnemius, toe extensors and flexors 5/5              DTR +2/= biceps, triceps, + 1 patella /=              Sensory: intact touch, temperature and vibratory  Healed left carotid surgical scar    Significant Labs:   BMP:   Recent Labs   Lab 09/30/20 0744   *      K 2.9*   CL 98   CO2 28   BUN 15   CREATININE 0.8   CALCIUM 9.1   MG 1.9     CBC:   Recent Labs   Lab 09/30/20 0744   WBC 8.71   HGB 14.1   HCT 42.3        TSH:   Recent Labs   Lab 09/30/20 0744   TSH 35.850*       Significant Imaging: CT: I have reviewed all pertinent results/findings within the past 24 hours and my personal findings are:  small falcine SAH, otherwise negative    Assessment/Plan: 1. Fall with small non-surgical SAH 2. History of left sided weakness pre-fall, concern small stroke - obtain carotid duplex, ECHO with bubble, continue DAP and statin, ECG, may need telemetry 3. CAD, post CABG and stents 4. Hypothyroidism 5. Post left CEA     Active Diagnoses:    Diagnosis Date Noted POA    PRINCIPAL PROBLEM:  Subarachnoid hemorrhage [I60.9] 09/30/2020 Yes    Atrial fibrillation [I48.91] 09/30/2020 Yes     Hypothyroidism [E03.9] 09/30/2020 Yes    Diabetes mellitus [E11.9] 09/30/2020 Yes    Hypomagnesemia [E83.42] 09/30/2020 Yes    Orthostatic hypotension [I95.1] 09/30/2020 Yes      Problems Resolved During this Admission:     VTE Risk Mitigation (From admission, onward)         Ordered     IP VTE HIGH RISK PATIENT  Once      09/30/20 0038     Place sequential compression device  Until discontinued      09/30/20 0038                HOS POC IP DISCHARGE SUMMARY    Thank you for your consult. I will follow-up with patient. Please contact us if you have any additional questions.    Rodriguez Pickett MD  Department of Hospital Neurology   [facility]

## 2020-09-30 NOTE — ASSESSMENT & PLAN NOTE
Continue Sotalol  Documented episode of AFIB on Pacemaker interrogation  Will need to start anticoagulation once ok per neuro  Per Neuro recs continue DAPT and ok to start AC in 2 weeks  Telemetry monitoring

## 2020-10-01 ENCOUNTER — TELEPHONE (OUTPATIENT)
Dept: NEUROSURGERY | Facility: CLINIC | Age: 70
End: 2020-10-01

## 2020-10-01 VITALS
DIASTOLIC BLOOD PRESSURE: 61 MMHG | OXYGEN SATURATION: 94 % | BODY MASS INDEX: 43.79 KG/M2 | TEMPERATURE: 98 F | RESPIRATION RATE: 14 BRPM | HEIGHT: 67 IN | WEIGHT: 279 LBS | HEART RATE: 74 BPM | SYSTOLIC BLOOD PRESSURE: 106 MMHG

## 2020-10-01 DIAGNOSIS — I60.9 SUBARACHNOID HEMORRHAGE: Primary | ICD-10-CM

## 2020-10-01 LAB
ALBUMIN SERPL BCP-MCNC: 3.9 G/DL (ref 3.5–5.2)
ALP SERPL-CCNC: 74 U/L (ref 55–135)
ALT SERPL W/O P-5'-P-CCNC: 22 U/L (ref 10–44)
ANION GAP SERPL CALC-SCNC: 9 MMOL/L (ref 8–16)
AST SERPL-CCNC: 26 U/L (ref 10–40)
BILIRUB SERPL-MCNC: 0.6 MG/DL (ref 0.1–1)
BUN SERPL-MCNC: 15 MG/DL (ref 8–23)
CALCIUM SERPL-MCNC: 9 MG/DL (ref 8.7–10.5)
CHLORIDE SERPL-SCNC: 99 MMOL/L (ref 95–110)
CO2 SERPL-SCNC: 31 MMOL/L (ref 23–29)
CREAT SERPL-MCNC: 1 MG/DL (ref 0.5–1.4)
EST. GFR  (AFRICAN AMERICAN): >60 ML/MIN/1.73 M^2
EST. GFR  (NON AFRICAN AMERICAN): 58 ML/MIN/1.73 M^2
GLUCOSE SERPL-MCNC: 177 MG/DL (ref 70–110)
POTASSIUM SERPL-SCNC: 3.2 MMOL/L (ref 3.5–5.1)
PROT SERPL-MCNC: 6.7 G/DL (ref 6–8.4)
SODIUM SERPL-SCNC: 139 MMOL/L (ref 136–145)

## 2020-10-01 PROCEDURE — 99232 PR SUBSEQUENT HOSPITAL CARE,LEVL II: ICD-10-PCS | Mod: ,,, | Performed by: PSYCHIATRY & NEUROLOGY

## 2020-10-01 PROCEDURE — 99232 SBSQ HOSP IP/OBS MODERATE 35: CPT | Mod: ,,, | Performed by: PSYCHIATRY & NEUROLOGY

## 2020-10-01 PROCEDURE — 36415 COLL VENOUS BLD VENIPUNCTURE: CPT

## 2020-10-01 PROCEDURE — 25000003 PHARM REV CODE 250: Performed by: INTERNAL MEDICINE

## 2020-10-01 PROCEDURE — 80053 COMPREHEN METABOLIC PANEL: CPT

## 2020-10-01 PROCEDURE — 25000003 PHARM REV CODE 250: Performed by: NURSE PRACTITIONER

## 2020-10-01 RX ADMIN — CLOPIDOGREL 75 MG: 75 TABLET, FILM COATED ORAL at 08:10

## 2020-10-01 RX ADMIN — ASPIRIN 81 MG: 81 TABLET, COATED ORAL at 08:10

## 2020-10-01 RX ADMIN — SOTALOL HYDROCHLORIDE 80 MG: 80 TABLET ORAL at 08:10

## 2020-10-01 RX ADMIN — LEVOTHYROXINE SODIUM 100 MCG: 0.1 TABLET ORAL at 05:10

## 2020-10-01 NOTE — TELEPHONE ENCOUNTER
Spoke with pt in reference to scheduling a CT and 4 week f/u, pt stated she would like to have CT performed at [facility], order has been faxed, ct is w/wo contrast, pt stated she is on Metformin, informed pt to hold Metformin for 48 hours and she is to have her renal functions check prior to restart of metformin, I've reached out to Dr. Sheets's office for an order to hold Metformin for 48 hour after CT, spoke with Claudia stated that was fine to hold med, i've also faxed the order to recheck renal function and faxed message to have written order to hold metformin faxed back, offered to schedule pt for 4 week f/u pt stated she will need to look at her calendar and will get back with us//ns

## 2020-10-01 NOTE — PLAN OF CARE
Patient received laying in bed awake, x4, in no acute distress. Vitals present within stable limits. Medications tolerated well. Neurochecks done q2hrs ordered; no new deficits observed during shift. Pt. Ambulates with  assistx1 to bathroom and in room. Will continue to monitor progress.

## 2020-10-01 NOTE — PLAN OF CARE
10/01/20 1729   Final Note   Assessment Type Final Discharge Note   Anticipated Discharge Disposition Home   Right Care Referral Info   Post Acute Recommendation No Care

## 2020-10-01 NOTE — CONSULTS
[facility]  Neurosurgery  Consult Note    Consults  Subjective:     Chief Complaint/Reason for Admission: Subarachnoid Hemorrhage      History of Present Illness:  Monae Van is a 70 year old female who presented to [facility] from [facility] for Neurosurgery evaluation. She reported left sided weakness which onset about 4 days ago. Of note, she fell and hit her head and woke up with severe headache. She had CT head at [facility] which revealed a small SAH without falx effect. She was subsequently transferred to [facility] for Neurosurgery evaluation. She reported associated slurred speech and left sided weakness that was noticeable per family members. Her current medical conditions include AFIB, CAD s/p CABG, s/p Left CEA, HTN, HLP, PAD, PPM, COPD, DM. Cardiology consulted to assist with medical management. Chart reviewed, patient seen and examined. Pacemaker interrogation revealed an episode of 4 hours of AFIB. ECHO pending. Neurology recommended to resume DAPT and Statin therapy for small non-surgical SAH/left sided weakness concerning for small CVA. Of note, her TSH is 35.85 and she was started on synthroid this admission. She is followed by Dr Santos in [place].     PTA Medications   Medication Sig    aspirin (ECOTRIN) 81 MG EC tablet Take 81 mg by mouth once daily.    celecoxib (CELEBREX) 200 MG capsule Take 200 mg by mouth.    citalopram (CELEXA) 40 MG tablet Take 40 mg by mouth once daily.    clopidogreL (PLAVIX) 75 mg tablet Take 75 mg by mouth once daily.    gabapentin (NEURONTIN) 300 MG capsule Take 300 mg by mouth 3 (three) times daily.    levothyroxine 150 mcg Cap Take by mouth.    metFORMIN (GLUCOPHAGE) 1000 MG tablet Take 1,000 mg by mouth 2 (two) times daily with meals.    simvastatin (ZOCOR) 40 MG tablet Take 40 mg by mouth every evening.    isosorbide mononitrate (ISMO,MONOKET) 10 mg tablet Take 10 mg by mouth 2 (two) times daily.    magnesium oxide  (MAG-OX) 400 mg (241.3 mg magnesium) tablet Take 400 mg by mouth once daily.    midodrine (PROAMATINE) 5 MG Tab Take 2.5 mg by mouth 2 (two) times daily with meals.    oxybutynin (DITROPAN-XL) 5 MG TR24 Take 5 mg by mouth once daily.    potassium chloride (KLOR-CON) 8 MEQ TbSR Take 8 mEq by mouth once.    sotaloL (BETAPACE) 120 MG Tab Take 80 mg by mouth every 12 (twelve) hours.       Review of patient's allergies indicates:   Allergen Reactions    Avelox [moxifloxacin]     Hydrocodone     Morpholine analogues     Panlor (hydrocodone-acetamin)        Past Medical History:   Diagnosis Date    A-fib     CAD (coronary artery disease)     COPD (chronic obstructive pulmonary disease)     Diabetes     HTN (hypertension)     Pacemaker     [employer] [facility]: MRI Compatible    PAD (peripheral artery disease)     S/P CABG (coronary artery bypass graft) 2017    S/P carotid endarterectomy 2015    left     Past Surgical History:   Procedure Laterality Date    APPENDECTOMY      cardiac stents      CHOLECYSTECTOMY      CORONARY ARTERY BYPASS GRAFT      HYSTERECTOMY      THYROID SURGERY       Family History     Problem Relation (Age of Onset)    Stroke Sister        Tobacco Use    Smoking status: Former Smoker     Quit date:      Years since quittin.7    Smokeless tobacco: Never Used   Substance and Sexual Activity    Alcohol use: Never     Frequency: Never    Drug use: Never    Sexual activity: Not on file     Review of Systems   Constitution: Positive for malaise/fatigue.   HENT: Negative for hearing loss and hoarse voice.    Eyes: Negative for blurred vision and visual disturbance.   Cardiovascular: Negative for chest pain, claudication, dyspnea on exertion, irregular heartbeat, leg swelling, near-syncope, orthopnea, palpitations, paroxysmal nocturnal dyspnea and syncope.   Respiratory: Negative for cough, hemoptysis, shortness of breath, sleep disturbances due to breathing, snoring and wheezing.     Endocrine: Negative for cold intolerance and heat intolerance.   Hematologic/Lymphatic: Bruises/bleeds easily.   Skin: Negative for color change, dry skin and nail changes.   Musculoskeletal: Positive for arthritis and back pain. Negative for joint pain and myalgias.   Gastrointestinal: Negative for bloating, abdominal pain, constipation, nausea and vomiting.   Genitourinary: Negative for dysuria, flank pain, hematuria and hesitancy.   Neurological: Positive for focal weakness and headaches. Negative for light-headedness, loss of balance, numbness, paresthesias and weakness.   Psychiatric/Behavioral: Negative for altered mental status.   Allergic/Immunologic: Negative for environmental allergies.   Objective:     Weight: 126.6 kg (279 lb)  Body mass index is 43.7 kg/m².  Vital Signs (Most Recent):  Temp: 97.7 °F (36.5 °C) (10/01/20 0722)  Pulse: 74 (10/01/20 0722)  Resp: 14 (10/01/20 0722)  BP: 106/61 (10/01/20 0722)  SpO2: (!) 94 % (10/01/20 0722) Vital Signs (24h Range):  Temp:  [97.4 °F (36.3 °C)-98.3 °F (36.8 °C)] 97.7 °F (36.5 °C)  Pulse:  [69-87] 74  Resp:  [14-18] 14  SpO2:  [93 %-96 %] 94 %  BP: (106-148)/(60-78) 106/61     Date 10/01/20 0700 - 10/02/20 0659   Shift 9306-3524 [id number] 5234-4954 24 Hour Total   INTAKE   P.O. 240   240   Shift Total(mL/kg) 240(1.9)   240(1.9)   OUTPUT   Shift Total(mL/kg)       Weight (kg) 126.6 126.6 126.6 126.6                        Physical Exam:    Constitutional: She appears well-developed and well-nourished. No distress.     Eyes: Pupils are equal, round, and reactive to light. EOM are normal.     Cardiovascular: Normal rate.     Abdominal: Soft.     Skin: Skin displays no rash on trunk.     Psych/Behavior: She is alert. She is oriented to person, place, and time. She has a normal mood and affect.     Musculoskeletal:        Neck: Range of motion is full. Muscle strength is 5/5. Tone is normal.        Back: Range of motion is full. Muscle strength is 5/5. Tone is  normal.        Right Upper Extremities: There is no tenderness. Tone is normal.        Left Upper Extremities: There is no tenderness. Tone is normal.       Right Lower Extremities: There is no tenderness.        Left Lower Extremities: There is no tenderness.     Neurological:        Sensory: There is no sensory deficit in the trunk. There is no sensory deficit in the extremities.        DTRs: DTRs are normal.        Cranial nerves: Cranial nerve(s) II, III, IV, V, VI, VII, VIII, IX, X, XI and XII are intact.     Vitals reviewed      Significant Labs:  Recent Labs   Lab 09/30/20  0744   *      K 2.9*   CL 98   CO2 28   BUN 15   CREATININE 0.8   CALCIUM 9.1   MG 1.9     Recent Labs   Lab 09/30/20  0744   WBC 8.71   HGB 14.1   HCT 42.3        No results for input(s): LABPT, INR, APTT in the last 48 hours.  Microbiology Results (last 7 days)     ** No results found for the last 168 hours. **        Recent Lab Results       09/30/20  1227   09/30/20  1051        Ao root annulus 3.31       Ascending aorta 3.20       Ao peak kulwinder 0.92       Ao VTI 16.94       AV valve area 2.72       AV mean gradient 2       AV index (prosthetic) 0.92       AV peak gradient 3       AV Velocity Ratio 0.82       BSA 2.45       Cholesterol   224  Comment:  The National Cholesterol Education Program (NCEP) has set the  following guidelines (reference ranges) for Cholesterol:  Optimal.....................<200 mg/dL  Borderline High.............200-239 mg/dL  High........................> or = 240 mg/dL       Left Ventricle Relative Wall Thickness 0.70       E/A ratio 0.83       E wave decelartion time 255.65       FS 19       HDL   48  Comment:  The National Cholesterol Education Program (NCEP) has set the  following guidelines (reference values) for HDL Cholesterol:  Low...............<40 mg/dL  Optimal...........>60 mg/dL       Hdl/Cholesterol Ratio   21.4     IVRT 96.89       IVS 1.68       LA WIDTH 3.67       Left  Atrium Major Axis 4.65       Left Atrium Minor Axis 4.88       LA size 3.86       LA volume 57.34       LA Volume Index 24.6       LVOT area 3.0       LDL Cholesterol External   135.2  Comment:  The National Cholesterol Education Program (NCEP) has set the  following guidelines (reference values) for LDL Cholesterol:  Optimal.......................<130 mg/dL  Borderline High...............130-159 mg/dL  High..........................160-189 mg/dL  Very High.....................>190 mg/dL       LV Diastolic Volume 111.94       LV Diastolic Volume Index 48.06       LVIDd 4.88       LVIDs 3.93       LV mass 372.79       LV Mass Index 160       LVOT diameter 1.94       LVOT peak zenon 0.75       LVOT stroke volume 46.12       LVOT peak VTI 15.61       LV Systolic Volume 66.97       LV Systolic Volume Index 28.8       Mr max zenon 0.04       MV valve area p 1/2 method 2.97       MV Peak A Zenon 0.58       MV Peak E Zenon 0.48       MV stenosis pressure 1/2 time 74.14       Non-HDL Cholesterol   176  Comment:  Risk category and Non-HDL cholesterol goals:  Coronary heart disease (CHD)or equivalent (10-year risk of CHD >20%):  Non-HDL cholesterol goal     <130 mg/dL  Two or more CHD risk factors and 10-year risk of CHD <= 20%:  Non-HDL cholesterol goal     <160 mg/dL  0 to 1 CHD risk factor:  Non-HDL cholesterol goal     <190 mg/dL       PV mean gradient 1.26       Posterior Wall 1.70       Right Atrial Pressure (from IVC) 3       RA Major Axis 3.65       RA Width 3.48       RVOT peak zenon 0.69       RVOT peak VTI 14.32       Sinus 3.54       STJ 3.45       TAPSE 1.53       Total Cholesterol/HDL Ratio   4.7     Triglycerides   204  Comment:  The National Cholesterol Education Program (NCEP) has set the  following guidelines (reference values) for triglycerides:  Normal......................<150 mg/dL  Borderline High.............150-199 mg/dL  High........................200-499 mg/dL           All pertinent labs from the last 24  hours have been reviewed.  Significant Diagnostics:  I have reviewed all pertinent imaging results/findings within the past 24 hours.    CT Head Without Contrast  Order: [id number]  Status:  Final result   Visible to patient:  No (not released) Next appt:  None  Details    Reading Physician Reading Date Result Priority   Loc Astudillo MD  [phone] 9/30/2020 Routine      Narrative & Impression     EXAMINATION:  CT HEAD WITHOUT CONTRAST     CLINICAL HISTORY:  follow up of SAH;     TECHNIQUE:  Axial images through the brain and posterior fossa were obtained without the use of IV contrast.  Sagittal and coronal reconstructions are provided for review.     COMPARISON:  Outside study from September 29, 2020     FINDINGS:  The focus of blood along the anterior falx, on both sides of the falx, in a subdural or subarachnoid location has not significantly progressed or regressed in the interim.  Negative for new areas of hemorrhage.     The ventricles are midline and the CSF spaces are prominent.  The gray-white matter junction is well preserved. Negative for intracranial vascular abnormalities. Negative for mass, mass effect, cerebral edema, or abnormal fluid collections.  Intracranial atherosclerotic disease.  Small vessel ischemic changes.  Falx calcifications.     The skull and scalp are intact.  Hypoplastic right frontal sinus.  Patchy ethmoid air cell disease.  Rightward nasal septal deviation.  The rest of the paranasal sinuses, mastoid air cells, middle ears and ear canals are clear. The globes are intact.     Impression:     1.  The appearance of the intrinsic high density along the anterior falx, which appears on both sides of the falx in the subarachnoid distribution, presumed to be hemorrhage, has not significantly changed in the interim.  Negative for new areas of hemorrhage.     2.  Other stable findings as noted above.                  Assessment/Plan:     Active Diagnoses:    Diagnosis Date Noted POA     PRINCIPAL PROBLEM:  Subarachnoid hemorrhage [I60.9] 09/30/2020 Yes    Atrial fibrillation [I48.91] 09/30/2020 Yes    Hypothyroidism [E03.9] 09/30/2020 Yes    Diabetes mellitus [E11.9] 09/30/2020 Yes    Hypomagnesemia [E83.42] 09/30/2020 Yes    Orthostatic hypotension [I95.1] 09/30/2020 Yes    Hyperlipidemia [E78.5] 09/30/2020 Yes    Tachycardia-bradycardia [I49.5] 09/30/2020 Yes    Peripheral arterial occlusive disease [I77.9] 09/30/2020 Yes    Stroke due to thrombosis of right middle cerebral artery [I63.311]  Unknown      Problems Resolved During this Admission:     The patient will follow-up with us (outpatient clinic) in 4 weeks with CT of head with and without contrast.  No surgery recs at this time.    Thank you for your consult.   I will follow-up with patient in 4 weeks.   Please contact us if you have any additional questions.    Yovani Conner PA-C  Neurosurgery  [facility]

## 2020-10-01 NOTE — DISCHARGE SUMMARY
"[facility] Hospital Medicine  Discharge Summary      Patient Name: Monae Van  MRN: [id number]  Admission Date: 9/29/2020  Hospital Length of Stay: 1 days  Discharge Date and Time:  10/01/2020 1:26 PM  Attending Physician: Mckenzie Elise MD   Discharging Provider: Michelle Mendez NP  Primary Care Provider: Yoshi Cordero MD      HPI:   History was taken from patient and from review of PMH from [facility]. She says she cannot remember exact dates.  70 y.o. female patient with a PMHx of A-fib, CAD, CABG, cardiac stents, diabete, HTN, low back pain, pacemaker, Left CEA, who presented to the ER for evaluation of left-sided extremity weakness which onset gradually 4 days ago. She was seen at [facility] on 09/29/20 with chief complaint of left sided weakness  For 3 days She went to visit her sister 4 days ago and she feel and hit and her head and woke up with severe headache. CT head showed a small subarachnoid hemorrhage without falx effect.  She was transferred here for neurosurgery evaluation.  There was associated history of slurred speech and left sided weakness that was noticed by the  granddaughter too. Patient denies any n/v, visual disturbance, LOC, dizziness, back pain, neck pain, knee pain, hip pain, abdominal pain. She is on Plavix and Aspirin at home.    * No surgery found *      Hospital Course:   She reports [employer] pacemaker from 2017, MRI compatible. 6 days ago she felt weakness in her left leg which led to a fall, striking her head without loss of consciousness. Some on and off headache. She felt that both her left arm and leg were weak and some paresthesias. She just felt "off" and went to bed for 3-4 days, slept a lot, was able to ambulate to bathroom. Some initial slurred speech which has resolved.  No N/V,no visual loss or diplopia. Finally sought medical help and CT head with small falcine bleed, stable on repeat CT today without any evidence of stroke. No headache at " "this time, hungry. No dysphagia. No CP or palpitations. Neurology consulted. Pacemaker interrogation for AFib pending. ECG shows atrial paced rythym. TSH 35.8    10/1/20 - Needs outpatient referral for physical therapy - 3 x week. No changes in Meds: Continue ASA 81 mg and Plavix 75 mg. She will need Eliquis 5 mg BID in 2 weeks and stop Plavix at that time, continue aspirin. Followup with her Cardiologist. [employer] Pacemaker was interrogated during admission and she was found to be in Atrial Fib which started the same time she "fell." Neurology felt patient had a CVA first, fell, then had SAH. Her Pacemaker is MRI compatible, but These MRI's are only done in [place]. Patient seen and examined and deemed stable for d/c.        Consults:   Consults (From admission, onward)        Status Ordering Provider     Inpatient consult to Cardiology  Once     Provider:  Aaron Collins MD    Completed JOHANN GOMEZ     Inpatient consult to Neurology  Once     Provider:  Rodriguez Pickett MD    Acknowledged JOHANN GOMEZ          No new Assessment & Plan notes have been filed under this hospital service since the last note was generated.  Service: Hospital Medicine    Final Active Diagnoses:    Diagnosis Date Noted POA    PRINCIPAL PROBLEM:  Subarachnoid hemorrhage [I60.9] 09/30/2020 Yes    Atrial fibrillation [I48.91] 09/30/2020 Yes    Hypothyroidism [E03.9] 09/30/2020 Yes    Diabetes mellitus [E11.9] 09/30/2020 Yes    Hypomagnesemia [E83.42] 09/30/2020 Yes    Orthostatic hypotension [I95.1] 09/30/2020 Yes    Hyperlipidemia [E78.5] 09/30/2020 Yes    Tachycardia-bradycardia [I49.5] 09/30/2020 Yes    Peripheral arterial occlusive disease [I77.9] 09/30/2020 Yes    Stroke due to thrombosis of right middle cerebral artery [I63.311]  Unknown      Problems Resolved During this Admission:       Discharged Condition: stable    Disposition: Home or Self Care    Follow Up:  Follow-up Information     Cardiology " In 1 week.           Neurology In 1 week.           Yoshi Cordero MD. Schedule an appointment as soon as possible for a visit in 3 days.    Specialty: Family Medicine  Contact information:  [address]  [place] [address] [phone]                 Patient Instructions:   No discharge procedures on file.    Significant Diagnostic Studies: Labs: All labs within the past 24 hours have been reviewed  Results for orders placed or performed during the hospital encounter of 09/29/20   COVID-19 Rapid Screening   Result Value Ref Range    SARS-CoV-2 RNA, Amplification, Qual Negative Negative   CBC auto differential   Result Value Ref Range    WBC 8.71 3.90 - 12.70 K/uL    RBC 4.58 4.00 - 5.40 M/uL    Hemoglobin 14.1 12.0 - 16.0 g/dL    Hematocrit 42.3 37.0 - 48.5 %    Mean Corpuscular Volume 92 82 - 98 fL    Mean Corpuscular Hemoglobin 30.8 27.0 - 31.0 pg    Mean Corpuscular Hemoglobin Conc 33.3 32.0 - 36.0 g/dL    RDW 14.2 11.5 - 14.5 %    Platelets 245 150 - 350 K/uL    MPV 11.0 9.2 - 12.9 fL    Immature Granulocytes 0.7 (H) 0.0 - 0.5 %    Gran # (ANC) 6.3 1.8 - 7.7 K/uL    Immature Grans (Abs) 0.06 (H) 0.00 - 0.04 K/uL    Lymph # 1.7 1.0 - 4.8 K/uL    Mono # 0.5 0.3 - 1.0 K/uL    Eos # 0.1 0.0 - 0.5 K/uL    Baso # 0.02 0.00 - 0.20 K/uL    nRBC 0 0 /100 WBC    Gran% 72.5 38.0 - 73.0 %    Lymph% 19.7 18.0 - 48.0 %    Mono% 5.5 4.0 - 15.0 %    Eosinophil% 1.4 0.0 - 8.0 %    Basophil% 0.2 0.0 - 1.9 %    Differential Method Automated    Comprehensive metabolic panel   Result Value Ref Range    Sodium 142 136 - 145 mmol/L    Potassium 2.9 (L) 3.5 - 5.1 mmol/L    Chloride 98 95 - 110 mmol/L    CO2 28 23 - 29 mmol/L    Glucose 124 (H) 70 - 110 mg/dL    BUN, Bld 15 8 - 23 mg/dL    Creatinine 0.8 0.5 - 1.4 mg/dL    Calcium 9.1 8.7 - 10.5 mg/dL    Total Protein 6.5 6.0 - 8.4 g/dL    Albumin 3.8 3.5 - 5.2 g/dL    Total Bilirubin 0.6 0.1 - 1.0 mg/dL    Alkaline Phosphatase 69 55 - 135 U/L    AST 22 10 - 40 U/L    ALT 20 10 - 44  U/L    Anion Gap 16 8 - 16 mmol/L    eGFR if African American >60 >60 mL/min/1.73 m^2    eGFR if non African American >60 >60 mL/min/1.73 m^2   TSH   Result Value Ref Range    TSH 35.850 (H) 0.400 - 4.000 uIU/mL   Magnesium   Result Value Ref Range    Magnesium 1.9 1.6 - 2.6 mg/dL   Lipid panel   Result Value Ref Range    Cholesterol 224 (H) 120 - 199 mg/dL    Triglycerides 204 (H) 30 - 150 mg/dL    HDL 48 40 - 75 mg/dL    LDL Cholesterol 135.2 63.0 - 159.0 mg/dL    Hdl/Cholesterol Ratio 21.4 20.0 - 50.0 %    Total Cholesterol/HDL Ratio 4.7 2.0 - 5.0    Non-HDL Cholesterol 176 mg/dL   T4, free   Result Value Ref Range    Free T4 <0.40 (L) 0.71 - 1.51 ng/dL   Comprehensive metabolic panel   Result Value Ref Range    Sodium 139 136 - 145 mmol/L    Potassium 3.2 (L) 3.5 - 5.1 mmol/L    Chloride 99 95 - 110 mmol/L    CO2 31 (H) 23 - 29 mmol/L    Glucose 177 (H) 70 - 110 mg/dL    BUN, Bld 15 8 - 23 mg/dL    Creatinine 1.0 0.5 - 1.4 mg/dL    Calcium 9.0 8.7 - 10.5 mg/dL    Total Protein 6.7 6.0 - 8.4 g/dL    Albumin 3.9 3.5 - 5.2 g/dL    Total Bilirubin 0.6 0.1 - 1.0 mg/dL    Alkaline Phosphatase 74 55 - 135 U/L    AST 26 10 - 40 U/L    ALT 22 10 - 44 U/L    Anion Gap 9 8 - 16 mmol/L    eGFR if African American >60 >60 mL/min/1.73 m^2    eGFR if non African American 58 (A) >60 mL/min/1.73 m^2   Echo Color Flow Doppler? Yes; Bubble Contrast? Yes   Result Value Ref Range    BSA 2.45 m2    LA WIDTH 3.67 cm    LVIDd 4.88 3.5 - 6.0 cm    IVS 1.68 (A) 0.6 - 1.1 cm    Posterior Wall 1.70 (A) 0.6 - 1.1 cm    Ao root annulus 3.31 cm    LVIDs 3.93 2.1 - 4.0 cm    FS 19 28 - 44 %    LA volume 57.34 cm3    Sinus 3.54 cm    STJ 3.45 cm    Ascending aorta 3.20 cm    LV mass 372.79 g    LA size 3.86 cm    TAPSE 1.53 cm    Left Ventricle Relative Wall Thickness 0.70 cm    AV mean gradient 2 mmHg    AV valve area 2.72 cm2    AV Velocity Ratio 0.82     AV index (prosthetic) 0.92     MV valve area p 1/2 method 2.97 cm2    E/A ratio 0.83      E wave decelartion time 255.65 msec    IVRT 96.89 msec    LVOT diameter 1.94 cm    LVOT area 3.0 cm2    LVOT peak zenon 0.75 m/s    LVOT peak VTI 15.61 cm    Ao peak zenon 0.92 m/s    Ao VTI 16.94 cm    RVOT peak zenon 0.69 m/s    RVOT peak VTI 14.32 cm    Mr max zenon 0.04 m/s    LVOT stroke volume 46.12 cm3    AV peak gradient 3 mmHg    PV mean gradient 1.26 mmHg    MV Peak E Zenon 0.48 m/s    MV stenosis pressure 1/2 time 74.14 ms    MV Peak A Zenon 0.58 m/s    LV Systolic Volume 66.97 mL    LV Systolic Volume Index 28.8 mL/m2    LV Diastolic Volume 111.94 mL    LV Diastolic Volume Index 48.06 mL/m2    LA Volume Index 24.6 mL/m2    LV Mass Index 160 g/m2    RA Major Axis 3.65 cm    Left Atrium Minor Axis 4.88 cm    Left Atrium Major Axis 4.65 cm    RA Width 3.48 cm    Right Atrial Pressure (from IVC) 3 mmHg     Pending Diagnostic Studies:     None        Imaging Results    None        Medications:  Reconciled Home Medications:      Medication List      ASK your doctor about these medications    aspirin 81 MG EC tablet  Commonly known as: ECOTRIN  Take 81 mg by mouth once daily.     celecoxib 200 MG capsule  Commonly known as: CeleBREX  Take 200 mg by mouth.     citalopram 40 MG tablet  Commonly known as: CELEXA  Take 40 mg by mouth once daily.     clopidogreL 75 mg tablet  Commonly known as: PLAVIX  Take 75 mg by mouth once daily.     gabapentin 300 MG capsule  Commonly known as: NEURONTIN  Take 300 mg by mouth 3 (three) times daily.     isosorbide mononitrate 10 mg tablet  Commonly known as: ISMO,MONOKET  Take 10 mg by mouth 2 (two) times daily.     levothyroxine 150 mcg Cap  Take by mouth.     magnesium oxide 400 mg (241.3 mg magnesium) tablet  Commonly known as: MAG-OX  Take 400 mg by mouth once daily.     metFORMIN 1000 MG tablet  Commonly known as: GLUCOPHAGE  Take 1,000 mg by mouth 2 (two) times daily with meals.     midodrine 5 MG Tab  Commonly known as: PROAMATINE  Take 2.5 mg by mouth 2 (two) times daily with  meals.     oxybutynin 5 MG Tr24  Commonly known as: DITROPAN-XL  Take 5 mg by mouth once daily.     potassium chloride 8 MEQ Tbsr  Commonly known as: KLOR-CON  Take 8 mEq by mouth once.     simvastatin 40 MG tablet  Commonly known as: ZOCOR  Take 40 mg by mouth every evening.     sotaloL 120 MG Tab  Commonly known as: BETAPACE  Take 80 mg by mouth every 12 (twelve) hours.            Indwelling Lines/Drains at time of discharge:   Lines/Drains/Airways     None                 Time spent on the discharge of patient: 45 minutes  Patient was seen and examined on the date of discharge and determined to be suitable for discharge.         Michelle Mendez NP  Department of Hospital Medicine  [facility]

## 2020-10-01 NOTE — PLAN OF CARE
No acute events overnight. Neuro checks WDL. Afib on the monitor. Ambulating with assistance. Chart reviewed.

## 2020-10-01 NOTE — PROGRESS NOTES
[facility] Hospital Medicine  Progress Note    Patient Name: Monae aVn  MRN: [id number]  Patient Class: IP- Inpatient   Admission Date: 9/29/2020  Length of Stay: 1 days  Attending Physician: Mckenzie Elise MD  Primary Care Provider: Yoshi Cordero MD        Subjective: left sided weakness with fall     Principal Problem:Subarachnoid hemorrhage    Interval History: she feels like her left leg is better, ambulating    Review of Systems no headache, swallow good, slept well  Objective:     Vital Signs (Most Recent):  Temp: 97.7 °F (36.5 °C) (10/01/20 0722)  Pulse: 74 (10/01/20 0722)  Resp: 14 (10/01/20 0722)  BP: 106/61 (10/01/20 0722)  SpO2: (!) 94 % (10/01/20 0722) Vital Signs (24h Range):  Temp:  [97.4 °F (36.3 °C)-98.3 °F (36.8 °C)] 97.7 °F (36.5 °C)  Pulse:  [69-87] 74  Resp:  [14-18] 14  SpO2:  [93 %-96 %] 94 %  BP: (106-148)/(60-78) 106/61     Weight: 126.6 kg (279 lb)  Body mass index is 43.7 kg/m².    Intake/Output Summary (Last 24 hours) at 10/1/2020 1125  Last data filed at 10/1/2020 0800  Gross per 24 hour   Intake 420 ml   Output --   Net 420 ml      Physical Exam   Awake, alert  Speech clear  No weakness  VII: symmetric  Sensory intact      Overview/Hospital Course: small traumatic falcine SAH from fall from stroke affecting left leg. Has AF on pacemaker interrogation and ECHO with hypokinetic LV     Significant Labs:   CMP:   Recent Labs   Lab 09/30/20  0744 10/01/20  0803    139   K 2.9* 3.2*   CL 98 99   CO2 28 31*   * 177*   BUN 15 15   CREATININE 0.8 1.0   CALCIUM 9.1 9.0   PROT 6.5 6.7   ALBUMIN 3.8 3.9   BILITOT 0.6 0.6   ALKPHOS 69 74   AST 22 26   ALT 20 22   ANIONGAP 16 9   EGFRNONAA >60 58*     Lipid Panel:   Recent Labs   Lab 09/30/20  1051   CHOL 224*   HDL 48   LDLCALC 135.2   TRIG 204*   CHOLHDL 21.4     All pertinent labs within the past 24 hours have been reviewed.    Significant Imaging: no new imaging    Assessment/Plan: right MCA territory stroke  in setting paroxysmal AF with fall with small traumatic SAH. Will need long term anticoagulation but with acute SAH would wait 2 weeks to begin and at that time stop Plavix. LDL still elevated despite 60 mg Simvastatin. Poorly tolerated Lipitor in past.      Active Diagnoses:    Diagnosis Date Noted POA    PRINCIPAL PROBLEM:  Subarachnoid hemorrhage [I60.9] 09/30/2020 Yes    Atrial fibrillation [I48.91] 09/30/2020 Yes    Hypothyroidism [E03.9] 09/30/2020 Yes    Diabetes mellitus [E11.9] 09/30/2020 Yes    Hypomagnesemia [E83.42] 09/30/2020 Yes    Orthostatic hypotension [I95.1] 09/30/2020 Yes    Hyperlipidemia [E78.5] 09/30/2020 Yes    Tachycardia-bradycardia [I49.5] 09/30/2020 Yes    Peripheral arterial occlusive disease [I77.9] 09/30/2020 Yes    Stroke due to thrombosis of right middle cerebral artery [I63.311]  Unknown      Problems Resolved During this Admission:     VTE Risk Mitigation (From admission, onward)         Ordered     IP VTE HIGH RISK PATIENT  Once      09/30/20 0038     Place sequential compression device  Until discontinued      09/30/20 0038                   Rodriguez Pickett MD  Department of Hospital Neurology   [facility]

## 2020-10-01 NOTE — PT/OT/SLP EVAL
Physical Therapy Evaluation and Discharge Note    Patient Name:  Monae Van   MRN:  [id number]    Recommendations:     Discharge Recommendations:  outpatient PT   Discharge Equipment Recommendations: none ; use her RW until steady without AD  Barriers to discharge: None; can stay with sister for a few days or sister stay with her    Assessment:     Monae Van is a 70 y.o. female admitted with a medical diagnosis of Subarachnoid hemorrhage. .  At this time, patient is functioning at their prior level of function and does not require further acute PT services.     Recent Surgery: * No surgery found *      Plan:     During this hospitalization, patient does not require further acute PT services.  Please re-consult if situation changes.      Subjective     Chief Complaint: none  Patient/Family Comments/goals: to go home and return to work  Pain/Comfort:  ·      Patients cultural, spiritual, Hinduism conflicts given the current situation:      Living Environment:  Lives alone in a trailer with 6 steps with 1 rail to enter  Prior to admission, patients level of function was indep - RW vs cane prn.  Equipment used at home: walker, rolling, cane, straight.  DME owned (not currently used): none.  Upon discharge, patient will have assistance from family, then wants OPPT.    Objective:     Communicated with rN prior to session.  Patient found up in chair with telemetry upon PT entry to room.    General Precautions: Standard,     Orthopedic Precautions:N/A   Braces:       Exams:  · b LE rom wfl; R LE 5/5 and L LE 4/5 to 4+/5 strength    Functional Mobility:  · Bed Mobility - sba with cues for efficiency  · Transfers - sba from bedside chair x 2 reps - no AD 2nd trial; no gross LOB  Gt - >50ft RW sba no gross LOB; no L knee buckling    AM-PAC 6 CLICK MOBILITY  Total Score:        Therapeutic Activities and Exercises:   PT educated patient on POC to d/c skilled acte care PT & defer to  for amb maintenance & exs, B LE TE  to prep mobility/dec stiffness before oob/walking AND safety/fall precautions with mobility using RW. Emphasized to use RW until her strength returns fully - she agreed.    AM-PAC 6 CLICK MOBILITY  Total Score:      Patient left up in chair with all lines intact, call button in reach, chair alarm on and rN notified.    GOALS:   Multidisciplinary Problems     Physical Therapy Goals     Not on file                History:     Past Medical History:   Diagnosis Date    A-fib     CAD (coronary artery disease)     COPD (chronic obstructive pulmonary disease)     Diabetes     HTN (hypertension)     Pacemaker     [employer]: MRI Compatible    PAD (peripheral artery disease)     S/P CABG (coronary artery bypass graft) 2017    S/P carotid endarterectomy 2015    left       Past Surgical History:   Procedure Laterality Date    APPENDECTOMY      cardiac stents      CHOLECYSTECTOMY      CORONARY ARTERY BYPASS GRAFT      HYSTERECTOMY      THYROID SURGERY         Time Tracking:     PT Received On: 09/30/20  PT Start Time: 1330     PT Stop Time: 1410  PT Total Time (min): 40 min     Billable Minutes: Evaluation 15, Therapeutic Activity 15 and Therapeutic Exercise 10      Jama Lee, PT  10/01/2020

## 2020-10-02 ENCOUNTER — PATIENT OUTREACH (OUTPATIENT)
Dept: ADMINISTRATIVE | Facility: CLINIC | Age: 70
End: 2020-10-02

## 2020-10-02 ENCOUNTER — TELEPHONE (OUTPATIENT)
Dept: NEUROSURGERY | Facility: CLINIC | Age: 70
End: 2020-10-02

## 2020-10-02 NOTE — TELEPHONE ENCOUNTER
Spoke with pt in reference to the message below, pt's order is now at the correct facility, order confirmations has been made with Darin at [facility], stated they will be giving pt a call to schedule on Monday//ns    ----- Message from Sybil Elizabeth sent at 10/2/2020  3:20 PM CDT -----  Would like to consult back with nurse to inform ct scan orders were sent to the wrong place, states it should have been sent to [facility]. Please give a call back at [phone].

## 2020-10-02 NOTE — PATIENT INSTRUCTIONS
Hemorrhagic Stroke: Subarachnoid Hemorrhage  A hemorrhagic stroke happens when a blood vessel in the brain ruptures or leaks. A blood vessel on the surface of the brain bursts (hemorrhages). This spills blood into the surrounding tissue. This type of stroke often happens suddenly, with little warning. It is the most serious of all types of strokes. A subarachnoid hemorrhage is a type of hemorrhagic stroke.     What happens during a subarachnoid hemorrhage?  This type of stroke happens when a major blood vessel bursts on the surface of the brain. Normally, the space between the brain and the skull is filled with a clear fluid. When the blood vessel bursts, blood flows into the space between the brain and the skull. The amount of fluid in this space increases and puts pressure on the brain. This pressure can damage nearby parts of the brain.  Most of these strokes happen when a cerebral aneurysm bursts. An aneurysm is a weak spot in the wall of a blood vessel. A bubble often forms in this weak spot. In some cases, a cerebral aneurysm causes pain or other symptoms. In most cases, though, an aneurysm causes no symptoms until it bursts.  What are the symptoms of a subarachnoid hemorrhage?  Any stroke is a medical emergency. If you have any of these symptoms, even if they seem to get better, call 911 right away:  · Sudden, excruciating headache with no known cause  · Nausea and vomiting (often with headache)  · Sudden confusion or decrease in alertness  · Trouble moving or loss of feeling, especially on the face, arm, or leg specifically on one side of the body  · Sudden trouble walking, dizziness, loss of balance or coordination  · Sudden trouble talking or understanding speech    · Sudden mood changes  · Sudden dimness, double vision, or loss of vision, particularly in one eye  · Eyes suddenly very sensitive to light  · Neck and shoulder pain or stiff neck  · Seizure  How is a hemorrhagic stroke treated?   The acute  phase lasts from the first minutes to hours after symptoms start. During this phase, treatment focuses on relieving pressure on the brain and preventing further damage. A procedure or surgery may be done to repair the ruptured (burst) aneurysm. A drain may be placed into the brain to relieve pressure. Medicines to control blood pressure may be given through an IV line. Seizure medicine is often given. Tests will likely be done to check for other aneurysms in the brain. If they are found, surgery may be done to reduce the risk that they will burst and bleed.  After the acute phase, treatment focuses on recovery. Long-term damage from a stroke can include paralysis, trouble speaking or understanding, and trouble thinking clearly. Rehabilitation therapy (rehab) can help reduce these effects and regain skills. Rehab starts in the hospital and generally continues in an inpatient or outpatient facility, and eventually at home. It will focus on regaining lost skills. It may include:  · Help regaining movement.  · Therapy for speech and language  · Help with swallowing  · Help reducing risk factors for another stroke, such as smoking  In addition, medicines that help prevent another stroke may be given. These include medicines to control blood pressure and prevent bleeding.  Date Last Reviewed: 8/26/2015  © 3949-0555 [employer]. [address] [place] [address], [place] [address]. All rights reserved. This information is not intended as a substitute for professional medical care. Always follow your healthcare professional's instructions.

## 2020-10-08 ENCOUNTER — TELEPHONE (OUTPATIENT)
Dept: NEUROSURGERY | Facility: CLINIC | Age: 70
End: 2020-10-08

## 2020-10-15 ENCOUNTER — TELEPHONE (OUTPATIENT)
Dept: NEUROSURGERY | Facility: CLINIC | Age: 70
End: 2020-10-15

## 2020-10-15 NOTE — TELEPHONE ENCOUNTER
Spoke with Natasha in regards to portal message, pt was seen by Dr. Lomax, cardiologist, stated pt has completed a new CT of head  wwo contrast, subarachnoid hemmorhage is still present but a lesser degree, discharge instructions stated for pt to take aspirin and plavix but to hold Eliquis, for 2 weeks pt is now at the 2 week kyler, Cardiologist is wanting know should pt restart eliquis since hemorrhage is still present, infonisha Kaur, I will pass this information on to Provider and give her a call back, she is aware Providers are in sx on today//ns

## 2020-10-16 ENCOUNTER — TELEPHONE (OUTPATIENT)
Dept: NEUROSURGERY | Facility: CLINIC | Age: 70
End: 2020-10-16

## 2020-10-16 NOTE — TELEPHONE ENCOUNTER
Called Dr. Ward, office to inform, Provider would like for pt to cont regimen she is currently on until he's able to view the images. I've also reached out to [facility], will place imaging in the mail today, [phone].     Left message to have pt give call back to schedule a 4 week f/u from hospital//ns

## 2020-10-21 ENCOUNTER — TELEPHONE (OUTPATIENT)
Dept: NEUROSURGERY | Facility: CLINIC | Age: 70
End: 2020-10-21

## 2020-10-21 NOTE — TELEPHONE ENCOUNTER
Left vm for pt to gives us a call back in regards to setting her a appointment left call back for pt to return call

## 2020-10-22 ENCOUNTER — TELEPHONE (OUTPATIENT)
Dept: NEUROSURGERY | Facility: CLINIC | Age: 70
End: 2020-10-22

## 2020-10-22 NOTE — TELEPHONE ENCOUNTER
Left vm for pt to gives us a call back in regards to setting her a appointment left call back for pt to return call.

## 2020-10-22 NOTE — TELEPHONE ENCOUNTER
Left message in reference to pt message for a follow up appt, pt is scheduled on 11/4 at 3pm, asked pt to give call back if date/time didn't work//ns

## 2020-10-22 NOTE — TELEPHONE ENCOUNTER
----- Message from Jennifer Rogers sent at 10/22/2020 12:02 PM CDT -----  Contact: Monae Licona is requesting a call back to schedule appointment. She can be reached at [phone] thanks

## 2020-10-23 ENCOUNTER — PATIENT MESSAGE (OUTPATIENT)
Dept: NEUROSURGERY | Facility: CLINIC | Age: 70
End: 2020-10-23

## 2023-12-15 ENCOUNTER — LAB REQUISITION (OUTPATIENT)
Dept: LAB | Facility: HOSPITAL | Age: 73
End: 2023-12-15
Attending: FAMILY MEDICINE
Payer: MEDICARE

## 2023-12-15 DIAGNOSIS — E11.9 TYPE 2 DIABETES MELLITUS WITHOUT COMPLICATIONS: ICD-10-CM

## 2023-12-15 DIAGNOSIS — E87.6 HYPOKALEMIA: ICD-10-CM

## 2023-12-15 DIAGNOSIS — J44.9 CHRONIC OBSTRUCTIVE PULMONARY DISEASE, UNSPECIFIED: ICD-10-CM

## 2023-12-15 DIAGNOSIS — E03.9 HYPOTHYROIDISM, UNSPECIFIED: ICD-10-CM

## 2023-12-15 DIAGNOSIS — I48.91 UNSPECIFIED ATRIAL FIBRILLATION: ICD-10-CM

## 2023-12-15 LAB
ALBUMIN SERPL-MCNC: 2.7 G/DL (ref 3.4–4.8)
ALBUMIN/GLOB SERPL: 0.9 RATIO (ref 1.1–2)
ALP SERPL-CCNC: 78 UNIT/L (ref 40–150)
ALT SERPL-CCNC: 25 UNIT/L (ref 0–55)
AST SERPL-CCNC: 20 UNIT/L (ref 5–34)
BASOPHILS # BLD AUTO: 0.01 X10(3)/MCL
BASOPHILS NFR BLD AUTO: 0.3 %
BILIRUB SERPL-MCNC: 0.4 MG/DL
BUN SERPL-MCNC: 16.8 MG/DL (ref 9.8–20.1)
CALCIUM SERPL-MCNC: 8.9 MG/DL (ref 8.4–10.2)
CHLORIDE SERPL-SCNC: 103 MMOL/L (ref 98–107)
CHOLEST SERPL-MCNC: 108 MG/DL
CHOLEST/HDLC SERPL: 4 {RATIO} (ref 0–5)
CO2 SERPL-SCNC: 34 MMOL/L (ref 23–31)
CREAT SERPL-MCNC: 0.8 MG/DL (ref 0.55–1.02)
EOSINOPHIL # BLD AUTO: 0.05 X10(3)/MCL (ref 0–0.9)
EOSINOPHIL NFR BLD AUTO: 1.4 %
ERYTHROCYTE [DISTWIDTH] IN BLOOD BY AUTOMATED COUNT: 16.4 % (ref 11.5–17)
EST. AVERAGE GLUCOSE BLD GHB EST-MCNC: 162.8 MG/DL
FT4I SERPL CALC-MCNC: 2.57 (ref 2.6–3.6)
GFR SERPLBLD CREATININE-BSD FMLA CKD-EPI: >60 MLS/MIN/1.73/M2
GLOBULIN SER-MCNC: 3 GM/DL (ref 2.4–3.5)
GLUCOSE SERPL-MCNC: 107 MG/DL (ref 82–115)
HBA1C MFR BLD: 7.3 %
HCT VFR BLD AUTO: 38.1 % (ref 37–47)
HDLC SERPL-MCNC: 25 MG/DL (ref 35–60)
HGB BLD-MCNC: 11.4 G/DL (ref 12–16)
IMM GRANULOCYTES # BLD AUTO: 0.04 X10(3)/MCL (ref 0–0.04)
IMM GRANULOCYTES NFR BLD AUTO: 1.1 %
LDLC SERPL CALC-MCNC: 57 MG/DL (ref 50–140)
LYMPHOCYTES # BLD AUTO: 1.27 X10(3)/MCL (ref 0.6–4.6)
LYMPHOCYTES NFR BLD AUTO: 35 %
MAGNESIUM SERPL-MCNC: 2.1 MG/DL (ref 1.6–2.6)
MCH RBC QN AUTO: 28.2 PG (ref 27–31)
MCHC RBC AUTO-ENTMCNC: 29.9 G/DL (ref 33–36)
MCV RBC AUTO: 94.3 FL (ref 80–94)
MONOCYTES # BLD AUTO: 0.38 X10(3)/MCL (ref 0.1–1.3)
MONOCYTES NFR BLD AUTO: 10.5 %
NEUTROPHILS # BLD AUTO: 1.88 X10(3)/MCL (ref 2.1–9.2)
NEUTROPHILS NFR BLD AUTO: 51.7 %
PLATELET # BLD AUTO: 259 X10(3)/MCL (ref 130–400)
PMV BLD AUTO: 9.9 FL (ref 7.4–10.4)
POTASSIUM SERPL-SCNC: 3.6 MMOL/L (ref 3.5–5.1)
PROT SERPL-MCNC: 5.7 GM/DL (ref 5.8–7.6)
RBC # BLD AUTO: 4.04 X10(6)/MCL (ref 4.2–5.4)
SODIUM SERPL-SCNC: 146 MMOL/L (ref 136–145)
T3RU NFR SERPL: 43.34 % (ref 31–39)
T4 SERPL-MCNC: 5.94 UG/DL (ref 4.87–11.72)
TRIGL SERPL-MCNC: 129 MG/DL (ref 37–140)
TSH SERPL-ACNC: 3.29 UIU/ML (ref 0.35–4.94)
VLDLC SERPL CALC-MCNC: 26 MG/DL
WBC # SPEC AUTO: 3.63 X10(3)/MCL (ref 4.5–11.5)

## 2023-12-15 PROCEDURE — 85025 COMPLETE CBC W/AUTO DIFF WBC: CPT | Performed by: FAMILY MEDICINE

## 2023-12-15 PROCEDURE — 83735 ASSAY OF MAGNESIUM: CPT | Performed by: FAMILY MEDICINE

## 2023-12-15 PROCEDURE — 84436 ASSAY OF TOTAL THYROXINE: CPT | Performed by: FAMILY MEDICINE

## 2023-12-15 PROCEDURE — 80061 LIPID PANEL: CPT | Performed by: FAMILY MEDICINE

## 2023-12-15 PROCEDURE — 83036 HEMOGLOBIN GLYCOSYLATED A1C: CPT | Performed by: FAMILY MEDICINE

## 2023-12-15 PROCEDURE — 84443 ASSAY THYROID STIM HORMONE: CPT | Performed by: FAMILY MEDICINE

## 2023-12-15 PROCEDURE — 80053 COMPREHEN METABOLIC PANEL: CPT | Performed by: FAMILY MEDICINE

## 2024-04-08 PROCEDURE — 81003 URINALYSIS AUTO W/O SCOPE: CPT | Performed by: FAMILY MEDICINE

## 2024-04-08 PROCEDURE — 81001 URINALYSIS AUTO W/SCOPE: CPT | Performed by: FAMILY MEDICINE

## 2024-04-08 PROCEDURE — 87077 CULTURE AEROBIC IDENTIFY: CPT | Performed by: FAMILY MEDICINE

## 2024-04-09 ENCOUNTER — LAB REQUISITION (OUTPATIENT)
Dept: LAB | Facility: HOSPITAL | Age: 74
End: 2024-04-09
Attending: FAMILY MEDICINE
Payer: MEDICARE

## 2024-04-09 DIAGNOSIS — N39.0 URINARY TRACT INFECTION, SITE NOT SPECIFIED: ICD-10-CM

## 2024-04-09 LAB
APPEARANCE UR: ABNORMAL
BACTERIA #/AREA URNS AUTO: ABNORMAL /HPF
BILIRUB UR QL STRIP.AUTO: NEGATIVE
COLOR UR AUTO: YELLOW
GLUCOSE UR QL STRIP.AUTO: >=1000
KETONES UR QL STRIP.AUTO: ABNORMAL
LEUKOCYTE ESTERASE UR QL STRIP.AUTO: ABNORMAL
NITRITE UR QL STRIP.AUTO: NEGATIVE
PH UR STRIP.AUTO: 5 [PH]
PROT UR QL STRIP.AUTO: 30
RBC #/AREA URNS AUTO: >100 /HPF
RBC UR QL AUTO: ABNORMAL
SP GR UR STRIP.AUTO: 1.02 (ref 1–1.03)
SQUAMOUS #/AREA URNS AUTO: ABNORMAL /HPF
UROBILINOGEN UR STRIP-ACNC: 0.2
WBC #/AREA URNS AUTO: >100 /HPF
YEAST URNS QL MICRO: ABNORMAL /HPF

## 2024-04-11 LAB — BACTERIA UR CULT: ABNORMAL

## 2024-04-30 PROCEDURE — 82270 OCCULT BLOOD FECES: CPT | Performed by: FAMILY MEDICINE

## 2024-05-01 ENCOUNTER — LAB REQUISITION (OUTPATIENT)
Dept: LAB | Facility: HOSPITAL | Age: 74
End: 2024-05-01
Attending: FAMILY MEDICINE
Payer: MEDICARE

## 2024-05-01 DIAGNOSIS — K21.9 GASTRO-ESOPHAGEAL REFLUX DISEASE WITHOUT ESOPHAGITIS: ICD-10-CM

## 2024-05-01 LAB — HEMOCCULT SP1 STL QL: NEGATIVE

## 2024-05-08 ENCOUNTER — LAB REQUISITION (OUTPATIENT)
Dept: LAB | Facility: HOSPITAL | Age: 74
End: 2024-05-08
Payer: MEDICARE

## 2024-05-08 DIAGNOSIS — N39.0 URINARY TRACT INFECTION, SITE NOT SPECIFIED: ICD-10-CM

## 2024-05-08 LAB
BACTERIA #/AREA URNS AUTO: ABNORMAL /HPF
BILIRUB UR QL STRIP.AUTO: NEGATIVE
CLARITY UR: CLEAR
COLOR UR AUTO: YELLOW
GLUCOSE UR QL STRIP: >=1000
HGB UR QL STRIP: ABNORMAL
KETONES UR QL STRIP: NEGATIVE
LEUKOCYTE ESTERASE UR QL STRIP: ABNORMAL
NITRITE UR QL STRIP: NEGATIVE
PH UR STRIP: 5.5 [PH]
PROT UR QL STRIP: 30
RBC #/AREA URNS AUTO: ABNORMAL /HPF
SP GR UR STRIP.AUTO: 1.02 (ref 1–1.03)
SQUAMOUS #/AREA URNS AUTO: ABNORMAL /HPF
UROBILINOGEN UR STRIP-ACNC: 0.2
WBC #/AREA URNS AUTO: ABNORMAL /HPF
YEAST UR QL AUTO: ABNORMAL /HPF

## 2024-05-08 PROCEDURE — 81001 URINALYSIS AUTO W/SCOPE: CPT | Performed by: NURSE PRACTITIONER

## 2024-05-08 PROCEDURE — 87086 URINE CULTURE/COLONY COUNT: CPT | Performed by: NURSE PRACTITIONER

## 2024-05-11 LAB — BACTERIA UR CULT: NORMAL

## 2024-06-17 ENCOUNTER — LAB REQUISITION (OUTPATIENT)
Dept: LAB | Facility: HOSPITAL | Age: 74
End: 2024-06-17
Attending: FAMILY MEDICINE
Payer: MEDICARE

## 2024-06-17 DIAGNOSIS — E03.9 HYPOTHYROIDISM, UNSPECIFIED: ICD-10-CM

## 2024-06-17 DIAGNOSIS — I10 ESSENTIAL (PRIMARY) HYPERTENSION: ICD-10-CM

## 2024-06-17 DIAGNOSIS — E11.9 TYPE 2 DIABETES MELLITUS WITHOUT COMPLICATIONS: ICD-10-CM

## 2024-06-17 DIAGNOSIS — E78.5 HYPERLIPIDEMIA, UNSPECIFIED: ICD-10-CM

## 2024-06-18 LAB
ALBUMIN SERPL-MCNC: 3.3 G/DL (ref 3.4–4.8)
ALBUMIN/GLOB SERPL: 1.6 RATIO (ref 1.1–2)
ALP SERPL-CCNC: 76 UNIT/L (ref 40–150)
ALT SERPL-CCNC: 10 UNIT/L (ref 0–55)
ANION GAP SERPL CALC-SCNC: 8 MEQ/L
AST SERPL-CCNC: 15 UNIT/L (ref 5–34)
BASOPHILS # BLD AUTO: 0.03 X10(3)/MCL
BASOPHILS NFR BLD AUTO: 0.5 %
BILIRUB SERPL-MCNC: 0.4 MG/DL
BUN SERPL-MCNC: 17.2 MG/DL (ref 9.8–20.1)
CALCIUM SERPL-MCNC: 9.2 MG/DL (ref 8.4–10.2)
CHLORIDE SERPL-SCNC: 107 MMOL/L (ref 98–107)
CHOLEST SERPL-MCNC: 141 MG/DL
CHOLEST/HDLC SERPL: 4 {RATIO} (ref 0–5)
CO2 SERPL-SCNC: 28 MMOL/L (ref 23–31)
CREAT SERPL-MCNC: 1.07 MG/DL (ref 0.55–1.02)
CREAT/UREA NIT SERPL: 16
EOSINOPHIL # BLD AUTO: 0.16 X10(3)/MCL (ref 0–0.9)
EOSINOPHIL NFR BLD AUTO: 2.7 %
ERYTHROCYTE [DISTWIDTH] IN BLOOD BY AUTOMATED COUNT: 16.7 % (ref 11.5–17)
EST. AVERAGE GLUCOSE BLD GHB EST-MCNC: 162.8 MG/DL
GFR SERPLBLD CREATININE-BSD FMLA CKD-EPI: 55 ML/MIN/1.73/M2
GLOBULIN SER-MCNC: 2.1 GM/DL (ref 2.4–3.5)
GLUCOSE SERPL-MCNC: 215 MG/DL (ref 82–115)
HBA1C MFR BLD: 7.3 %
HCT VFR BLD AUTO: 39.6 % (ref 37–47)
HDLC SERPL-MCNC: 34 MG/DL (ref 35–60)
HGB BLD-MCNC: 12.4 G/DL (ref 12–16)
IMM GRANULOCYTES # BLD AUTO: 0.01 X10(3)/MCL (ref 0–0.04)
IMM GRANULOCYTES NFR BLD AUTO: 0.2 %
LDLC SERPL CALC-MCNC: 68 MG/DL (ref 50–140)
LYMPHOCYTES # BLD AUTO: 1.91 X10(3)/MCL (ref 0.6–4.6)
LYMPHOCYTES NFR BLD AUTO: 32.2 %
MAGNESIUM SERPL-MCNC: 2 MG/DL (ref 1.6–2.6)
MCH RBC QN AUTO: 28.8 PG (ref 27–31)
MCHC RBC AUTO-ENTMCNC: 31.3 G/DL (ref 33–36)
MCV RBC AUTO: 91.9 FL (ref 80–94)
MONOCYTES # BLD AUTO: 0.45 X10(3)/MCL (ref 0.1–1.3)
MONOCYTES NFR BLD AUTO: 7.6 %
NEUTROPHILS # BLD AUTO: 3.37 X10(3)/MCL (ref 2.1–9.2)
NEUTROPHILS NFR BLD AUTO: 56.8 %
PLATELET # BLD AUTO: 214 X10(3)/MCL (ref 130–400)
PMV BLD AUTO: 10.9 FL (ref 7.4–10.4)
POTASSIUM SERPL-SCNC: 3.6 MMOL/L (ref 3.5–5.1)
PROT SERPL-MCNC: 5.4 GM/DL (ref 5.8–7.6)
RBC # BLD AUTO: 4.31 X10(6)/MCL (ref 4.2–5.4)
SODIUM SERPL-SCNC: 143 MMOL/L (ref 136–145)
T3FREE SERPL-MCNC: 1.59 PG/ML (ref 1.58–3.91)
T4 FREE SERPL-MCNC: 0.92 NG/DL (ref 0.7–1.48)
TRIGL SERPL-MCNC: 194 MG/DL (ref 37–140)
TSH SERPL-ACNC: 13.42 UIU/ML (ref 0.35–4.94)
VLDLC SERPL CALC-MCNC: 39 MG/DL
WBC # BLD AUTO: 5.93 X10(3)/MCL (ref 4.5–11.5)

## 2024-06-18 PROCEDURE — 83036 HEMOGLOBIN GLYCOSYLATED A1C: CPT | Performed by: FAMILY MEDICINE

## 2024-06-18 PROCEDURE — 80053 COMPREHEN METABOLIC PANEL: CPT | Performed by: FAMILY MEDICINE

## 2024-06-18 PROCEDURE — 83735 ASSAY OF MAGNESIUM: CPT | Performed by: FAMILY MEDICINE

## 2024-06-18 PROCEDURE — 84443 ASSAY THYROID STIM HORMONE: CPT | Performed by: FAMILY MEDICINE

## 2024-06-18 PROCEDURE — 84481 FREE ASSAY (FT-3): CPT | Performed by: FAMILY MEDICINE

## 2024-06-18 PROCEDURE — 84439 ASSAY OF FREE THYROXINE: CPT | Performed by: FAMILY MEDICINE

## 2024-06-18 PROCEDURE — 85025 COMPLETE CBC W/AUTO DIFF WBC: CPT | Performed by: FAMILY MEDICINE

## 2024-06-18 PROCEDURE — 80061 LIPID PANEL: CPT | Performed by: FAMILY MEDICINE

## 2024-07-29 ENCOUNTER — LAB REQUISITION (OUTPATIENT)
Dept: LAB | Facility: HOSPITAL | Age: 74
End: 2024-07-29
Attending: FAMILY MEDICINE
Payer: MEDICARE

## 2024-07-29 DIAGNOSIS — I50.30 UNSPECIFIED DIASTOLIC (CONGESTIVE) HEART FAILURE: ICD-10-CM

## 2024-07-29 DIAGNOSIS — I48.0 PAROXYSMAL ATRIAL FIBRILLATION: ICD-10-CM

## 2024-07-29 DIAGNOSIS — I25.10 ATHEROSCLEROTIC HEART DISEASE OF NATIVE CORONARY ARTERY WITHOUT ANGINA PECTORIS: ICD-10-CM

## 2024-07-30 LAB
T3FREE SERPL-MCNC: 1.55 PG/ML (ref 1.58–3.91)
T4 FREE SERPL-MCNC: 1.05 NG/DL (ref 0.7–1.48)
TSH SERPL-ACNC: 11.85 UIU/ML (ref 0.35–4.94)

## 2024-07-30 PROCEDURE — 84439 ASSAY OF FREE THYROXINE: CPT | Performed by: FAMILY MEDICINE

## 2024-07-30 PROCEDURE — 84481 FREE ASSAY (FT-3): CPT | Performed by: FAMILY MEDICINE

## 2024-07-30 PROCEDURE — 84443 ASSAY THYROID STIM HORMONE: CPT | Performed by: FAMILY MEDICINE

## 2024-09-02 ENCOUNTER — LAB REQUISITION (OUTPATIENT)
Dept: LAB | Facility: HOSPITAL | Age: 74
End: 2024-09-02
Attending: FAMILY MEDICINE
Payer: MEDICARE

## 2024-09-02 DIAGNOSIS — E03.9 HYPOTHYROIDISM, UNSPECIFIED: ICD-10-CM

## 2024-09-03 LAB
T3FREE SERPL-MCNC: 1.51 PG/ML (ref 1.58–3.91)
T4 FREE SERPL-MCNC: 1.03 NG/DL (ref 0.7–1.48)
TSH SERPL-ACNC: 8.73 UIU/ML (ref 0.35–4.94)

## 2024-09-03 PROCEDURE — 84443 ASSAY THYROID STIM HORMONE: CPT | Performed by: FAMILY MEDICINE

## 2024-09-03 PROCEDURE — 84481 FREE ASSAY (FT-3): CPT | Performed by: FAMILY MEDICINE

## 2024-09-03 PROCEDURE — 84439 ASSAY OF FREE THYROXINE: CPT | Performed by: FAMILY MEDICINE

## 2024-10-07 ENCOUNTER — LAB REQUISITION (OUTPATIENT)
Dept: LAB | Facility: HOSPITAL | Age: 74
End: 2024-10-07
Attending: FAMILY MEDICINE
Payer: MEDICARE

## 2024-10-07 DIAGNOSIS — E78.5 HYPERLIPIDEMIA, UNSPECIFIED: ICD-10-CM

## 2024-10-08 LAB
T3FREE SERPL-MCNC: 1.84 PG/ML (ref 1.58–3.91)
T4 FREE SERPL-MCNC: 1.15 NG/DL (ref 0.7–1.48)
TSH SERPL-ACNC: 6.98 UIU/ML (ref 0.35–4.94)

## 2024-10-08 PROCEDURE — 84443 ASSAY THYROID STIM HORMONE: CPT | Performed by: FAMILY MEDICINE

## 2024-10-08 PROCEDURE — 84481 FREE ASSAY (FT-3): CPT | Performed by: FAMILY MEDICINE

## 2024-10-08 PROCEDURE — 84439 ASSAY OF FREE THYROXINE: CPT | Performed by: FAMILY MEDICINE

## 2024-11-11 ENCOUNTER — LAB REQUISITION (OUTPATIENT)
Dept: LAB | Facility: HOSPITAL | Age: 74
End: 2024-11-11
Attending: FAMILY MEDICINE
Payer: MEDICARE

## 2024-11-11 DIAGNOSIS — E03.9 HYPOTHYROIDISM, UNSPECIFIED: ICD-10-CM

## 2024-11-12 LAB
T4 FREE SERPL-MCNC: 1.25 NG/DL (ref 0.7–1.48)
TSH SERPL-ACNC: 2.35 UIU/ML (ref 0.35–4.94)

## 2024-11-12 PROCEDURE — 84439 ASSAY OF FREE THYROXINE: CPT | Performed by: FAMILY MEDICINE

## 2024-11-12 PROCEDURE — 84481 FREE ASSAY (FT-3): CPT | Performed by: FAMILY MEDICINE

## 2024-11-12 PROCEDURE — 84443 ASSAY THYROID STIM HORMONE: CPT | Performed by: FAMILY MEDICINE

## 2024-11-13 LAB — T3FREE SERPL-MCNC: 1.61 PG/ML (ref 1.58–3.91)

## 2024-12-16 ENCOUNTER — LAB REQUISITION (OUTPATIENT)
Dept: LAB | Facility: HOSPITAL | Age: 74
End: 2024-12-16
Attending: FAMILY MEDICINE
Payer: MEDICARE

## 2024-12-16 DIAGNOSIS — E78.5 HYPERLIPIDEMIA, UNSPECIFIED: ICD-10-CM

## 2024-12-16 DIAGNOSIS — E03.9 HYPOTHYROIDISM, UNSPECIFIED: ICD-10-CM

## 2024-12-16 DIAGNOSIS — E11.9 TYPE 2 DIABETES MELLITUS WITHOUT COMPLICATIONS: ICD-10-CM

## 2024-12-16 DIAGNOSIS — I10 ESSENTIAL (PRIMARY) HYPERTENSION: ICD-10-CM

## 2024-12-17 LAB
ALBUMIN SERPL-MCNC: 3.2 G/DL (ref 3.4–4.8)
ALBUMIN/GLOB SERPL: 1.1 RATIO (ref 1.1–2)
ALP SERPL-CCNC: 83 UNIT/L (ref 40–150)
ALT SERPL-CCNC: 16 UNIT/L (ref 0–55)
ANION GAP SERPL CALC-SCNC: 8 MEQ/L
AST SERPL-CCNC: 16 UNIT/L (ref 5–34)
BASOPHILS # BLD AUTO: 0.04 X10(3)/MCL
BASOPHILS NFR BLD AUTO: 0.5 %
BILIRUB SERPL-MCNC: 0.6 MG/DL
BUN SERPL-MCNC: 14.9 MG/DL (ref 9.8–20.1)
CALCIUM SERPL-MCNC: 8.9 MG/DL (ref 8.4–10.2)
CHLORIDE SERPL-SCNC: 101 MMOL/L (ref 98–107)
CHOLEST SERPL-MCNC: 135 MG/DL
CHOLEST/HDLC SERPL: 4 {RATIO} (ref 0–5)
CO2 SERPL-SCNC: 30 MMOL/L (ref 23–31)
CREAT SERPL-MCNC: 0.95 MG/DL (ref 0.55–1.02)
CREAT/UREA NIT SERPL: 16
EOSINOPHIL # BLD AUTO: 0.15 X10(3)/MCL (ref 0–0.9)
EOSINOPHIL NFR BLD AUTO: 2 %
ERYTHROCYTE [DISTWIDTH] IN BLOOD BY AUTOMATED COUNT: 15.8 % (ref 11.5–17)
EST. AVERAGE GLUCOSE BLD GHB EST-MCNC: 145.6 MG/DL
GFR SERPLBLD CREATININE-BSD FMLA CKD-EPI: >60 ML/MIN/1.73/M2
GLOBULIN SER-MCNC: 2.9 GM/DL (ref 2.4–3.5)
GLUCOSE SERPL-MCNC: 173 MG/DL (ref 82–115)
HBA1C MFR BLD: 6.7 %
HCT VFR BLD AUTO: 43.7 % (ref 37–47)
HDLC SERPL-MCNC: 32 MG/DL (ref 35–60)
HGB BLD-MCNC: 13.6 G/DL (ref 12–16)
IMM GRANULOCYTES # BLD AUTO: 0.02 X10(3)/MCL (ref 0–0.04)
IMM GRANULOCYTES NFR BLD AUTO: 0.3 %
LDLC SERPL CALC-MCNC: 63 MG/DL (ref 50–140)
LYMPHOCYTES # BLD AUTO: 1.15 X10(3)/MCL (ref 0.6–4.6)
LYMPHOCYTES NFR BLD AUTO: 15.7 %
MAGNESIUM SERPL-MCNC: 1.9 MG/DL (ref 1.6–2.6)
MCH RBC QN AUTO: 28 PG (ref 27–31)
MCHC RBC AUTO-ENTMCNC: 31.1 G/DL (ref 33–36)
MCV RBC AUTO: 90.1 FL (ref 80–94)
MONOCYTES # BLD AUTO: 0.59 X10(3)/MCL (ref 0.1–1.3)
MONOCYTES NFR BLD AUTO: 8 %
NEUTROPHILS # BLD AUTO: 5.39 X10(3)/MCL (ref 2.1–9.2)
NEUTROPHILS NFR BLD AUTO: 73.5 %
PLATELET # BLD AUTO: 263 X10(3)/MCL (ref 130–400)
PMV BLD AUTO: 10.1 FL (ref 7.4–10.4)
POTASSIUM SERPL-SCNC: 4.1 MMOL/L (ref 3.5–5.1)
PROT SERPL-MCNC: 6.1 GM/DL (ref 5.8–7.6)
RBC # BLD AUTO: 4.85 X10(6)/MCL (ref 4.2–5.4)
SODIUM SERPL-SCNC: 139 MMOL/L (ref 136–145)
T4 FREE SERPL-MCNC: 1.83 NG/DL (ref 0.7–1.48)
TRIGL SERPL-MCNC: 201 MG/DL (ref 37–140)
TSH SERPL-ACNC: 2.88 UIU/ML (ref 0.35–4.94)
VLDLC SERPL CALC-MCNC: 40 MG/DL
WBC # BLD AUTO: 7.34 X10(3)/MCL (ref 4.5–11.5)

## 2024-12-17 PROCEDURE — 84439 ASSAY OF FREE THYROXINE: CPT | Performed by: FAMILY MEDICINE

## 2024-12-17 PROCEDURE — 84443 ASSAY THYROID STIM HORMONE: CPT | Performed by: FAMILY MEDICINE

## 2024-12-17 PROCEDURE — 80053 COMPREHEN METABOLIC PANEL: CPT | Performed by: FAMILY MEDICINE

## 2024-12-17 PROCEDURE — 84481 FREE ASSAY (FT-3): CPT | Performed by: FAMILY MEDICINE

## 2024-12-17 PROCEDURE — 83735 ASSAY OF MAGNESIUM: CPT | Performed by: FAMILY MEDICINE

## 2024-12-17 PROCEDURE — 83036 HEMOGLOBIN GLYCOSYLATED A1C: CPT | Performed by: FAMILY MEDICINE

## 2024-12-17 PROCEDURE — 80061 LIPID PANEL: CPT | Performed by: FAMILY MEDICINE

## 2024-12-17 PROCEDURE — 85025 COMPLETE CBC W/AUTO DIFF WBC: CPT | Performed by: FAMILY MEDICINE

## 2024-12-18 LAB — T3FREE SERPL-MCNC: 1.81 PG/ML (ref 1.58–3.91)

## 2024-12-22 PROCEDURE — 82270 OCCULT BLOOD FECES: CPT | Performed by: FAMILY MEDICINE

## 2024-12-23 ENCOUNTER — LAB REQUISITION (OUTPATIENT)
Dept: LAB | Facility: HOSPITAL | Age: 74
End: 2024-12-23
Attending: FAMILY MEDICINE
Payer: MEDICARE

## 2024-12-23 DIAGNOSIS — K21.9 GASTRO-ESOPHAGEAL REFLUX DISEASE WITHOUT ESOPHAGITIS: ICD-10-CM

## 2024-12-23 LAB
COLOR STL: NORMAL
CONSISTENCY STL: NORMAL
HEMOCCULT SP1 STL QL: NEGATIVE

## 2024-12-24 ENCOUNTER — LAB REQUISITION (OUTPATIENT)
Dept: LAB | Facility: HOSPITAL | Age: 74
End: 2024-12-24
Attending: FAMILY MEDICINE
Payer: MEDICARE

## 2024-12-24 DIAGNOSIS — L40.9 PSORIASIS, UNSPECIFIED: ICD-10-CM

## 2024-12-24 DIAGNOSIS — Z11.59 ENCOUNTER FOR SCREENING FOR OTHER VIRAL DISEASES: ICD-10-CM

## 2024-12-24 DIAGNOSIS — I10 ESSENTIAL (PRIMARY) HYPERTENSION: ICD-10-CM

## 2024-12-24 DIAGNOSIS — L29.9 PRURITUS, UNSPECIFIED: ICD-10-CM

## 2024-12-26 LAB
ALBUMIN SERPL-MCNC: 2.9 G/DL (ref 3.4–4.8)
ALBUMIN/GLOB SERPL: 1.1 RATIO (ref 1.1–2)
ALP SERPL-CCNC: 90 UNIT/L (ref 40–150)
ALT SERPL-CCNC: 13 UNIT/L (ref 0–55)
ANION GAP SERPL CALC-SCNC: 5 MEQ/L
AST SERPL-CCNC: 12 UNIT/L (ref 5–34)
BILIRUB SERPL-MCNC: 0.4 MG/DL
BUN SERPL-MCNC: 22 MG/DL (ref 9.8–20.1)
CALCIUM SERPL-MCNC: 8.6 MG/DL (ref 8.4–10.2)
CHLORIDE SERPL-SCNC: 106 MMOL/L (ref 98–107)
CO2 SERPL-SCNC: 32 MMOL/L (ref 23–31)
CREAT SERPL-MCNC: 0.85 MG/DL (ref 0.55–1.02)
CREAT/UREA NIT SERPL: 26
ERYTHROCYTE [DISTWIDTH] IN BLOOD BY AUTOMATED COUNT: 15.5 % (ref 11.5–17)
GFR SERPLBLD CREATININE-BSD FMLA CKD-EPI: >60 ML/MIN/1.73/M2
GLOBULIN SER-MCNC: 2.6 GM/DL (ref 2.4–3.5)
GLUCOSE SERPL-MCNC: 150 MG/DL (ref 82–115)
HBV CORE AB SERPL QL IA: NONREACTIVE
HBV SURFACE AB SER-ACNC: 0.66 MIU/ML
HBV SURFACE AB SERPL IA-ACNC: NONREACTIVE M[IU]/ML
HBV SURFACE AG SERPL QL IA: NONREACTIVE
HCT VFR BLD AUTO: 40.3 % (ref 37–47)
HCV AB SERPL QL IA: NONREACTIVE
HGB BLD-MCNC: 12.2 G/DL (ref 12–16)
MCH RBC QN AUTO: 27.4 PG (ref 27–31)
MCHC RBC AUTO-ENTMCNC: 30.3 G/DL (ref 33–36)
MCV RBC AUTO: 90.6 FL (ref 80–94)
PLATELET # BLD AUTO: 244 X10(3)/MCL (ref 130–400)
PMV BLD AUTO: 10.3 FL (ref 7.4–10.4)
POTASSIUM SERPL-SCNC: 4.1 MMOL/L (ref 3.5–5.1)
PROT SERPL-MCNC: 5.5 GM/DL (ref 5.8–7.6)
RBC # BLD AUTO: 4.45 X10(6)/MCL (ref 4.2–5.4)
SODIUM SERPL-SCNC: 143 MMOL/L (ref 136–145)
WBC # BLD AUTO: 6.44 X10(3)/MCL (ref 4.5–11.5)

## 2024-12-26 PROCEDURE — 85027 COMPLETE CBC AUTOMATED: CPT | Performed by: FAMILY MEDICINE

## 2024-12-26 PROCEDURE — 86706 HEP B SURFACE ANTIBODY: CPT | Performed by: FAMILY MEDICINE

## 2024-12-26 PROCEDURE — 86704 HEP B CORE ANTIBODY TOTAL: CPT | Performed by: FAMILY MEDICINE

## 2024-12-26 PROCEDURE — 86803 HEPATITIS C AB TEST: CPT | Performed by: FAMILY MEDICINE

## 2024-12-26 PROCEDURE — 86480 TB TEST CELL IMMUN MEASURE: CPT | Performed by: FAMILY MEDICINE

## 2024-12-26 PROCEDURE — 80053 COMPREHEN METABOLIC PANEL: CPT | Performed by: FAMILY MEDICINE

## 2024-12-26 PROCEDURE — 87340 HEPATITIS B SURFACE AG IA: CPT | Performed by: FAMILY MEDICINE

## 2024-12-28 LAB
GAMMA INTERFERON BACKGROUND BLD IA-ACNC: 0.03 IU/ML
M TB IFN-G BLD-IMP: NEGATIVE
M TB IFN-G CD4+ BCKGRND COR BLD-ACNC: 0.08 IU/ML
M TB IFN-G CD4+CD8+ BCKGRND COR BLD-ACNC: 0.06 IU/ML
MITOGEN IGNF BCKGRD COR BLD-ACNC: 5.88 IU/ML

## 2024-12-29 PROCEDURE — 82272 OCCULT BLD FECES 1-3 TESTS: CPT | Performed by: FAMILY MEDICINE

## 2024-12-30 ENCOUNTER — LAB REQUISITION (OUTPATIENT)
Dept: LAB | Facility: HOSPITAL | Age: 74
End: 2024-12-30
Attending: FAMILY MEDICINE
Payer: MEDICARE

## 2024-12-30 DIAGNOSIS — K21.9 GASTRO-ESOPHAGEAL REFLUX DISEASE WITHOUT ESOPHAGITIS: ICD-10-CM

## 2024-12-30 LAB — HEMOCCULT SP2 STL QL: NEGATIVE

## 2025-01-24 ENCOUNTER — LAB REQUISITION (OUTPATIENT)
Dept: LAB | Facility: HOSPITAL | Age: 75
End: 2025-01-24
Payer: MEDICARE

## 2025-01-24 DIAGNOSIS — N32.81 OVERACTIVE BLADDER: ICD-10-CM

## 2025-01-24 LAB — PHOSPHATE UR-MCNC: 35.8 MG/DL

## 2025-01-24 PROCEDURE — 84105 ASSAY OF URINE PHOSPHORUS: CPT | Performed by: FAMILY MEDICINE

## 2025-01-27 PROCEDURE — 83986 ASSAY PH BODY FLUID NOS: CPT | Performed by: FAMILY MEDICINE

## 2025-01-28 ENCOUNTER — LAB REQUISITION (OUTPATIENT)
Dept: LAB | Facility: HOSPITAL | Age: 75
End: 2025-01-28
Attending: FAMILY MEDICINE
Payer: MEDICARE

## 2025-01-28 DIAGNOSIS — N32.81 OVERACTIVE BLADDER: ICD-10-CM

## 2025-01-28 LAB — PH UR STRIP: 5.5 [PH]

## 2025-03-03 ENCOUNTER — LAB REQUISITION (OUTPATIENT)
Dept: LAB | Facility: HOSPITAL | Age: 75
End: 2025-03-03
Attending: FAMILY MEDICINE
Payer: MEDICARE

## 2025-03-03 DIAGNOSIS — K21.9 GASTRO-ESOPHAGEAL REFLUX DISEASE WITHOUT ESOPHAGITIS: ICD-10-CM

## 2025-03-03 LAB
COLOR STL: NORMAL
CONSISTENCY STL: NORMAL
HEMOCCULT SP1 STL QL: NEGATIVE

## 2025-03-03 PROCEDURE — 82270 OCCULT BLOOD FECES: CPT | Performed by: FAMILY MEDICINE

## 2025-03-25 PROCEDURE — 82272 OCCULT BLD FECES 1-3 TESTS: CPT | Performed by: FAMILY MEDICINE

## 2025-06-16 ENCOUNTER — LAB REQUISITION (OUTPATIENT)
Dept: LAB | Facility: HOSPITAL | Age: 75
End: 2025-06-16
Payer: MEDICARE

## 2025-06-16 DIAGNOSIS — I48.0 PAROXYSMAL ATRIAL FIBRILLATION: ICD-10-CM

## 2025-06-16 DIAGNOSIS — E78.2 MIXED HYPERLIPIDEMIA: ICD-10-CM

## 2025-06-16 DIAGNOSIS — I50.30 UNSPECIFIED DIASTOLIC (CONGESTIVE) HEART FAILURE: ICD-10-CM

## 2025-06-16 DIAGNOSIS — E03.9 HYPOTHYROIDISM, UNSPECIFIED: ICD-10-CM

## 2025-06-16 DIAGNOSIS — E83.42 HYPOMAGNESEMIA: ICD-10-CM

## 2025-06-16 DIAGNOSIS — K21.9 GASTRO-ESOPHAGEAL REFLUX DISEASE WITHOUT ESOPHAGITIS: ICD-10-CM

## 2025-06-16 DIAGNOSIS — I25.10 ATHEROSCLEROTIC HEART DISEASE OF NATIVE CORONARY ARTERY WITHOUT ANGINA PECTORIS: ICD-10-CM

## 2025-06-16 DIAGNOSIS — I10 ESSENTIAL (PRIMARY) HYPERTENSION: ICD-10-CM

## 2025-06-16 DIAGNOSIS — E11.9 TYPE 2 DIABETES MELLITUS WITHOUT COMPLICATIONS: ICD-10-CM

## 2025-06-17 LAB
ALBUMIN SERPL-MCNC: 3.3 G/DL (ref 3.4–4.8)
ALBUMIN/GLOB SERPL: 1.4 RATIO (ref 1.1–2)
ALP SERPL-CCNC: 82 UNIT/L (ref 40–150)
ALT SERPL-CCNC: 19 UNIT/L (ref 0–55)
ANION GAP SERPL CALC-SCNC: 9 MEQ/L
AST SERPL-CCNC: 17 UNIT/L (ref 11–45)
BASOPHILS # BLD AUTO: 0.04 X10(3)/MCL
BASOPHILS NFR BLD AUTO: 0.6 %
BILIRUB SERPL-MCNC: 0.6 MG/DL
BUN SERPL-MCNC: 15.4 MG/DL (ref 9.8–20.1)
CALCIUM SERPL-MCNC: 9 MG/DL (ref 8.4–10.2)
CHLORIDE SERPL-SCNC: 106 MMOL/L (ref 98–107)
CHOLEST SERPL-MCNC: 114 MG/DL
CHOLEST/HDLC SERPL: 3 {RATIO} (ref 0–5)
CK SERPL-CCNC: 98 U/L (ref 29–168)
CO2 SERPL-SCNC: 27 MMOL/L (ref 23–31)
CREAT SERPL-MCNC: 0.9 MG/DL (ref 0.55–1.02)
CREAT/UREA NIT SERPL: 17
EOSINOPHIL # BLD AUTO: 0.09 X10(3)/MCL (ref 0–0.9)
EOSINOPHIL NFR BLD AUTO: 1.3 %
ERYTHROCYTE [DISTWIDTH] IN BLOOD BY AUTOMATED COUNT: 20.1 % (ref 11.5–17)
EST. AVERAGE GLUCOSE BLD GHB EST-MCNC: 114 MG/DL
GFR SERPLBLD CREATININE-BSD FMLA CKD-EPI: >60 ML/MIN/1.73/M2
GLOBULIN SER-MCNC: 2.4 GM/DL (ref 2.4–3.5)
GLUCOSE SERPL-MCNC: 112 MG/DL (ref 82–115)
HBA1C MFR BLD: 5.6 %
HCT VFR BLD AUTO: 41 % (ref 37–47)
HDLC SERPL-MCNC: 33 MG/DL (ref 35–60)
HGB BLD-MCNC: 13 G/DL (ref 12–16)
IMM GRANULOCYTES # BLD AUTO: 0.04 X10(3)/MCL (ref 0–0.04)
IMM GRANULOCYTES NFR BLD AUTO: 0.6 %
LDLC SERPL CALC-MCNC: 56 MG/DL (ref 50–140)
LYMPHOCYTES # BLD AUTO: 1.63 X10(3)/MCL (ref 0.6–4.6)
LYMPHOCYTES NFR BLD AUTO: 23.2 %
MAGNESIUM SERPL-MCNC: 2 MG/DL (ref 1.6–2.6)
MCH RBC QN AUTO: 27.5 PG (ref 27–31)
MCHC RBC AUTO-ENTMCNC: 31.7 G/DL (ref 33–36)
MCV RBC AUTO: 86.9 FL (ref 80–94)
MONOCYTES # BLD AUTO: 0.44 X10(3)/MCL (ref 0.1–1.3)
MONOCYTES NFR BLD AUTO: 6.3 %
NEUTROPHILS # BLD AUTO: 4.8 X10(3)/MCL (ref 2.1–9.2)
NEUTROPHILS NFR BLD AUTO: 68 %
PLATELET # BLD AUTO: 241 X10(3)/MCL (ref 130–400)
PMV BLD AUTO: 10.5 FL (ref 7.4–10.4)
POTASSIUM SERPL-SCNC: 4.2 MMOL/L (ref 3.5–5.1)
PROT SERPL-MCNC: 5.7 GM/DL (ref 5.8–7.6)
RBC # BLD AUTO: 4.72 X10(6)/MCL (ref 4.2–5.4)
SODIUM SERPL-SCNC: 142 MMOL/L (ref 136–145)
T3FREE SERPL-MCNC: <1.5 PG/ML (ref 1.58–3.91)
T4 FREE SERPL-MCNC: 1.57 NG/DL (ref 0.7–1.48)
TRIGL SERPL-MCNC: 123 MG/DL (ref 37–140)
TSH SERPL-ACNC: 3.2 UIU/ML (ref 0.35–4.94)
VLDLC SERPL CALC-MCNC: 25 MG/DL
WBC # BLD AUTO: 7.04 X10(3)/MCL (ref 4.5–11.5)

## 2025-06-17 PROCEDURE — 82550 ASSAY OF CK (CPK): CPT | Performed by: INTERNAL MEDICINE

## 2025-06-17 PROCEDURE — 83735 ASSAY OF MAGNESIUM: CPT | Performed by: INTERNAL MEDICINE

## 2025-06-17 PROCEDURE — 84481 FREE ASSAY (FT-3): CPT | Performed by: INTERNAL MEDICINE

## 2025-06-17 PROCEDURE — 80061 LIPID PANEL: CPT | Performed by: INTERNAL MEDICINE

## 2025-06-17 PROCEDURE — 80053 COMPREHEN METABOLIC PANEL: CPT | Performed by: INTERNAL MEDICINE

## 2025-06-17 PROCEDURE — 84439 ASSAY OF FREE THYROXINE: CPT | Performed by: INTERNAL MEDICINE

## 2025-06-17 PROCEDURE — 85025 COMPLETE CBC W/AUTO DIFF WBC: CPT | Performed by: INTERNAL MEDICINE

## 2025-06-17 PROCEDURE — 84443 ASSAY THYROID STIM HORMONE: CPT | Performed by: INTERNAL MEDICINE

## 2025-06-17 PROCEDURE — 83036 HEMOGLOBIN GLYCOSYLATED A1C: CPT | Performed by: INTERNAL MEDICINE

## 2025-07-15 ENCOUNTER — LAB REQUISITION (OUTPATIENT)
Dept: LAB | Facility: HOSPITAL | Age: 75
End: 2025-07-15
Attending: FAMILY MEDICINE
Payer: MEDICARE

## 2025-07-15 DIAGNOSIS — E03.9 HYPOTHYROIDISM, UNSPECIFIED: ICD-10-CM

## 2025-07-15 DIAGNOSIS — I10 ESSENTIAL (PRIMARY) HYPERTENSION: ICD-10-CM

## 2025-07-15 LAB
ALBUMIN SERPL-MCNC: 3.3 G/DL (ref 3.4–4.8)
ALBUMIN/GLOB SERPL: 1.2 RATIO (ref 1.1–2)
ALP SERPL-CCNC: 69 UNIT/L (ref 40–150)
ALT SERPL-CCNC: 13 UNIT/L (ref 0–55)
ANION GAP SERPL CALC-SCNC: 1 MEQ/L
AST SERPL-CCNC: 14 UNIT/L (ref 11–45)
BILIRUB SERPL-MCNC: 0.5 MG/DL
BUN SERPL-MCNC: 17.3 MG/DL (ref 9.8–20.1)
CALCIUM SERPL-MCNC: 8.9 MG/DL (ref 8.4–10.2)
CHLORIDE SERPL-SCNC: 105 MMOL/L (ref 98–107)
CO2 SERPL-SCNC: 36 MMOL/L (ref 23–31)
CREAT SERPL-MCNC: 0.92 MG/DL (ref 0.55–1.02)
CREAT/UREA NIT SERPL: 19
GFR SERPLBLD CREATININE-BSD FMLA CKD-EPI: >60 ML/MIN/1.73/M2
GLOBULIN SER-MCNC: 2.8 GM/DL (ref 2.4–3.5)
GLUCOSE SERPL-MCNC: 83 MG/DL (ref 82–115)
POTASSIUM SERPL-SCNC: 4.4 MMOL/L (ref 3.5–5.1)
PROT SERPL-MCNC: 6.1 GM/DL (ref 5.8–7.6)
SODIUM SERPL-SCNC: 142 MMOL/L (ref 136–145)
T4 FREE SERPL-MCNC: 1.32 NG/DL (ref 0.7–1.48)
TSH SERPL-ACNC: 3.96 UIU/ML (ref 0.35–4.94)

## 2025-07-15 PROCEDURE — 84439 ASSAY OF FREE THYROXINE: CPT | Performed by: FAMILY MEDICINE

## 2025-07-15 PROCEDURE — 84481 FREE ASSAY (FT-3): CPT | Performed by: FAMILY MEDICINE

## 2025-07-15 PROCEDURE — 84443 ASSAY THYROID STIM HORMONE: CPT | Performed by: FAMILY MEDICINE

## 2025-07-15 PROCEDURE — 80053 COMPREHEN METABOLIC PANEL: CPT | Performed by: FAMILY MEDICINE

## 2025-07-16 LAB — T3FREE SERPL-MCNC: 1.51 PG/ML (ref 1.58–3.91)

## 2025-08-18 ENCOUNTER — LAB REQUISITION (OUTPATIENT)
Dept: LAB | Facility: HOSPITAL | Age: 75
End: 2025-08-18
Attending: NURSE PRACTITIONER
Payer: MEDICARE

## 2025-08-18 DIAGNOSIS — E03.9 HYPOTHYROIDISM, UNSPECIFIED: ICD-10-CM

## 2025-08-18 LAB
T3FREE SERPL-MCNC: <1.5 PG/ML (ref 1.58–3.91)
T4 FREE SERPL-MCNC: 1.44 NG/DL (ref 0.7–1.48)
TSH SERPL-ACNC: 1.12 UIU/ML (ref 0.35–4.94)

## 2025-08-18 PROCEDURE — 84443 ASSAY THYROID STIM HORMONE: CPT | Performed by: NURSE PRACTITIONER

## 2025-08-18 PROCEDURE — 84439 ASSAY OF FREE THYROXINE: CPT | Performed by: NURSE PRACTITIONER

## 2025-08-18 PROCEDURE — 84481 FREE ASSAY (FT-3): CPT | Performed by: NURSE PRACTITIONER
